# Patient Record
Sex: MALE | Race: WHITE | Employment: FULL TIME | URBAN - METROPOLITAN AREA
[De-identification: names, ages, dates, MRNs, and addresses within clinical notes are randomized per-mention and may not be internally consistent; named-entity substitution may affect disease eponyms.]

---

## 2017-08-21 ENCOUNTER — TRANSFERRED RECORDS (OUTPATIENT)
Dept: HEALTH INFORMATION MANAGEMENT | Facility: CLINIC | Age: 47
End: 2017-08-21

## 2017-09-27 ENCOUNTER — OFFICE VISIT (OUTPATIENT)
Dept: UROLOGY | Facility: CLINIC | Age: 47
End: 2017-09-27
Payer: COMMERCIAL

## 2017-09-27 VITALS
WEIGHT: 200 LBS | HEIGHT: 71 IN | BODY MASS INDEX: 28 KG/M2 | HEART RATE: 65 BPM | SYSTOLIC BLOOD PRESSURE: 114 MMHG | DIASTOLIC BLOOD PRESSURE: 68 MMHG | OXYGEN SATURATION: 98 %

## 2017-09-27 DIAGNOSIS — R97.20 ELEVATED PROSTATE SPECIFIC ANTIGEN (PSA): Primary | ICD-10-CM

## 2017-09-27 PROCEDURE — 99214 OFFICE O/P EST MOD 30 MIN: CPT | Performed by: UROLOGY

## 2017-09-27 ASSESSMENT — PAIN SCALES - GENERAL: PAINLEVEL: NO PAIN (0)

## 2017-09-27 NOTE — PROGRESS NOTES
History: It is a great pleasure to see this very pleasant 46-year-old gentleman in follow-up consultation today.  We recall, he has a history of an elevated PSA, when first seen a few years ago his PSA was 4.6 and then has tended to stabilize in the range between 2 and 3.  However there is also a significant family history of prostate cancer in that his father has had prostate cancer, has had a radical prostatectomy and is doing well.  However the most recent PSA is 5.36, although he has had no significant urinary symptoms and no evidence of history of chronic prostatitis.  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     Social History Main Topics     Smoking status: Never Smoker     Smokeless tobacco: Never Used     Alcohol use No     Drug use: No     Sexual activity: Yes     Partners: Female     Other Topics Concern     None     Social History Narrative       Past Surgical History:   Procedure Laterality Date     COLONOSCOPY  2007    benign colon polyps     HERNIORRHAPHY UMBILICAL N/A 7/15/2016    Procedure: HERNIORRHAPHY UMBILICAL;  Surgeon: Akil Granda MD;  Location: [facility]     LAPAROSCOPIC HERNIORRHAPHY INGUINAL BILATERAL Bilateral 7/15/2016    Procedure: LAPAROSCOPIC HERNIORRHAPHY INGUINAL BILATERAL;  Surgeon: Akil Granda MD;  Location: [facility]       Family History   Problem Relation Age of Onset     Cancer - colorectal Father      Prostate Cancer Father        No current outpatient prescriptions on file.    10 point ROS of systems including Constitutional, Eyes, Respiratory, Cardiovascular, Gastroenterology, Genitourinary, Integumentary, Muscularskeletal, Psychiatric  "were all negative except for pertinent positives noted in my HPI.    Examination:   /68 (BP Location: Left arm, Patient Position: Sitting, Cuff Size: Adult Regular)  Pulse 65  Ht 1.803 m (5' 11\")  Wt 90.7 kg (200 lb)  SpO2 98%  BMI 27.89 kg/m2  General Impression: Very pleasant gentleman in no acute distress, well-oriented in time place and person  Mental Status: Normal.  HEENT.  There is no evidence of jaundice and the mucous membranes are normal  Skin: Skin is otherwise normal to examination  Respiratory System: The respiratory cycle is normal  Lymph Nodes: Not examined  Back/Flank Tenderness: Not examined  Cardiovascular System: Not examined  Abdominal Examination: Not examined  Extremities: No significant peripheral edema  Genitial: Not examined  Rectal Examination: Good sphincter tone, normal perianal sensation.  Smooth rectal mucosa with small hemorrhoids identified in the 8:00 position.  Perineum otherwise normal to examination.  Smooth mildly enlarged prostate with slight increase in texture on the right side as compared to the left.  There is no evidence of tenderness or bogginess.  Seminal vesicles.  Nonpalpable  Neurologic System: There are no focal abnormal clinical neurological signs in the central, or peripheral nervous systems    Impression: I reviewed his previous records carefully and discussed the situation with the patient.  He is 46 years of age with a family history of prostate cancer with a PSA that has risen now above its previous maximum level and with a slight change in texture on the right side of the prostate that may well be nothing significant.  However, in my opinion we should arrange for a T3 MRI of prostate at this time for further information about the integrity of the prostate gland.  I did discuss and explain this to him in detail today and also talked about the potential need for a biopsy should we identify anything in the prostate that would be considered " "suspicious.  We did of course have a careful discussion about the specificity of PSA in about discussion also about prostate cancer and some of the controversy that is associated with management of prostate cancer  This may involve a biopsy here in our office or if necessary an MRI fusion biopsy by one of my [facility] should this be necessary.  I did discuss the entire situation very carefully today with the patient.  Answered many questions    Plan: T3 MRI of prostate and see me after    Time: 25 minutes total time today, extensive discussion regarding the ongoing situation needed including discussions about MRI of prostate and potential biopsies of prostate    \"This dictation was performed with voice recognition software and may contain errors,  omissions and inadvertent word substitution.\"      "

## 2017-09-27 NOTE — LETTER
9/27/2017       RE: Charlie Zhang  [id number] [facility] [id number]     Dear Colleague,    Thank you for referring your patient, Charlie Zhang, to the [facility] [place] [facility]. Please see a copy of my visit note below.    History: It is a great pleasure to see this very pleasant 46-year-old gentleman in follow-up consultation today.  We recall, he has a history of an elevated PSA, when first seen a few years ago his PSA was 4.6 and then has tended to stabilize in the range between 2 and 3.  However there is also a significant family history of prostate cancer in that his father has had prostate cancer, has had a radical prostatectomy and is doing well.  However the most recent PSA is 5.36, although he has had no significant urinary symptoms and no evidence of history of chronic prostatitis.  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     Social History Main Topics     Smoking status: Never Smoker     Smokeless tobacco: Never Used     Alcohol use No     Drug use: No     Sexual activity: Yes     Partners: Female     Other Topics Concern     None     Social History Narrative       Past Surgical History:   Procedure Laterality Date     COLONOSCOPY  2007    benign colon polyps     HERNIORRHAPHY UMBILICAL N/A 7/15/2016    Procedure: HERNIORRHAPHY UMBILICAL;  Surgeon: Akil Granda MD;  Location: [facility]     LAPAROSCOPIC HERNIORRHAPHY INGUINAL BILATERAL Bilateral 7/15/2016    Procedure: LAPAROSCOPIC HERNIORRHAPHY INGUINAL BILATERAL;  Surgeon: Akil Granda MD;  Location: [facility]       Family History  "  Problem Relation Age of Onset     Cancer - colorectal Father      Prostate Cancer Father        No current outpatient prescriptions on file.    10 point ROS of systems including Constitutional, Eyes, Respiratory, Cardiovascular, Gastroenterology, Genitourinary, Integumentary, Muscularskeletal, Psychiatric were all negative except for pertinent positives noted in my HPI.    Examination:   /68 (BP Location: Left arm, Patient Position: Sitting, Cuff Size: Adult Regular)  Pulse 65  Ht 1.803 m (5' 11\")  Wt 90.7 kg (200 lb)  SpO2 98%  BMI 27.89 kg/m2  General Impression: Very pleasant gentleman in no acute distress, well-oriented in time place and person  Mental Status: Normal.  HEENT.  There is no evidence of jaundice and the mucous membranes are normal  Skin: Skin is otherwise normal to examination  Respiratory System: The respiratory cycle is normal  Lymph Nodes: Not examined  Back/Flank Tenderness: Not examined  Cardiovascular System: Not examined  Abdominal Examination: Not examined  Extremities: No significant peripheral edema  Genitial: Not examined  Rectal Examination: Good sphincter tone, normal perianal sensation.  Smooth rectal mucosa with small hemorrhoids identified in the 8:00 position.  Perineum otherwise normal to examination.  Smooth mildly enlarged prostate with slight increase in texture on the right side as compared to the left.  There is no evidence of tenderness or bogginess.  Seminal vesicles.  Nonpalpable  Neurologic System: There are no focal abnormal clinical neurological signs in the central, or peripheral nervous systems    Impression: I reviewed his previous records carefully and discussed the situation with the patient.  He is 46 years of age with a family history of prostate cancer with a PSA that has risen now above its previous maximum level and with a slight change in texture on the right side of the prostate that may well be nothing significant.  However, in my opinion we " "should arrange for a T3 MRI of prostate at this time for further information about the integrity of the prostate gland.  I did discuss and explain this to him in detail today and also talked about the potential need for a biopsy should we identify anything in the prostate that would be considered suspicious.  We did of course have a careful discussion about the specificity of PSA in about discussion also about prostate cancer and some of the controversy that is associated with management of prostate cancer  This may involve a biopsy here in our office or if necessary an MRI fusion biopsy by one of my [facility] should this be necessary.  I did discuss the entire situation very carefully today with the patient.  Answered many questions    Plan: T3 MRI of prostate and see me after    Time: 25 minutes total time today, extensive discussion regarding the ongoing situation needed including discussions about MRI of prostate and potential biopsies of prostate    \"This dictation was performed with voice recognition software and may contain errors,  omissions and inadvertent word substitution.\"    Sincerely,    Chadd Marshall MD      "

## 2017-09-27 NOTE — MR AVS SNAPSHOT
After Visit Summary   9/27/2017    Charlie Zhang    MRN: [id number]           Patient Information     Date Of Birth          1970        Visit Information        Provider Department      9/27/2017 8:00 AM Chadd Marshall MD [facility] [place]        Today's Diagnoses     Elevated prostate specific antigen (PSA)    -  1       Follow-ups after your visit        Follow-up notes from your care team     Return for T3 MRI of prostate at , SEE ME AFTER.      Your next 10 appointments already scheduled     Oct 07, 2017  8:30 AM CDT   (Arrive by 8:15 AM)   MR PROSTATE with [id number]   [facility] ([facility])    [address] [address] [phone]           Take your medicines as usual, unless your doctor tells you not to. Bring a list of your current medicines to your exam (including vitamins, minerals and over-the-counter drugs).  You will be given intravenous contrast for this exam. To prepare:   The day before your exam, drink extra fluids at least six 8-ounce glasses (unless your doctor tells you to restrict your fluids).   Have a blood test (creatinine test) within 30 days of your exam. Go to your clinic or Diagnostic Imaging Department for this test.  The MRI machine uses a strong magnet. Please wear clothes without metal (snaps, zippers). A sweatsuit works well, or we may give you a hospital gown.  Please remove any body piercings and hair extensions before you arrive. You will also remove watches, jewelry, hairpins, wallets, dentures, partial dental plates and hearing aids. You may wear contact lenses, and you may be able to wear your rings. We have a safe place to keep your personal items, but it is safer to leave them at home.   **IMPORTANT** THE INSTRUCTIONS BELOW ARE ONLY FOR THOSE PATIENTS WHO HAVE BEEN TOLD THEY WILL RECEIVE SEDATION OR GENERAL ANESTHESIA DURING THEIR  MRI PROCEDURE:  IF YOU WILL RECEIVE SEDATION (take medicine to help you relax during your exam):   You must get the medicine from your doctor before you arrive. Bring the medicine to the exam. Do not take it at home.   Arrive one hour early. Bring someone who can take you home after the test. Your medicine will make you sleepy. After the exam, you may not drive, take a bus or take a taxi by yourself.   No eating 8 hours before your exam. You may have clear liquids up until 4 hours before your exam. (Clear liquids include water, clear tea, black coffee and fruit juice without pulp.)  IF YOU WILL RECEIVE ANESTHESIA (be asleep for your exam):   Arrive 1 1/2 hours early. Bring someone who can take you home after the test. You may not drive, take a bus or take a taxi by yourself.   No eating 8 hours before your exam. You may have clear liquids up until 4 hours before your exam. (Clear liquids include water, clear tea, black coffee and fruit juice without pulp.)  Please call the Imaging Department at your exam site with any questions.            Oct 16, 2017  8:20 AM CDT   Return Visit with Chadd Marshall MD   [facility] ([facility])    [address]  [address]  [facility] [id number] [phone]              Future tests that were ordered for you today     Open Future Orders        Priority Expected Expires Ordered    MR Prostate [[id number]] Routine  9/27/2018 9/27/2017            Who to contact     If you have questions or need follow up information about today's clinic visit or your schedule please contact [facility] directly at [phone].  Normal or non-critical lab and imaging results will be communicated to you by MyChart, letter or phone within 4 business days after the clinic has received the results. If you do not hear from us within 7 days, please contact the clinic through MyChart or phone. If you have a  "critical or abnormal lab result, we will notify you by phone as soon as possible.  Submit refill requests through [employer] or call your pharmacy and they will forward the refill request to us. Please allow 3 business days for your refill to be completed.          Additional Information About Your Visit        [facility] Information     [employer] lets you send messages to your doctor, view your test results, renew your prescriptions, schedule appointments and more. To sign up, go to [email or web address] . Click on \"Log in\" on the left side of the screen, which will take you to the Welcome page. Then click on \"Sign up Now\" on the right side of the page.     You will be asked to enter the access code listed below, as well as some personal information. Please follow the directions to create your username and password.     Your access code is: [email or web address]  Expires: 2017  8:34 AM     Your access code will  in 90 days. If you need help or a new code, please call your [facility] clinic or [phone].        Care EveryWhere ID     This is your Care EveryWhere ID. This could be used by other organizations to access your [facility] medical records  [id number]        Your Vitals Were     Pulse Height Pulse Oximetry BMI (Body Mass Index)          65 1.803 m (5' 11\") 98% 27.89 kg/m2         Blood Pressure from Last 3 Encounters:   17 114/68   10/26/16 120/80   07/15/16 106/67    Weight from Last 3 Encounters:   17 90.7 kg (200 lb)   10/26/16 93 kg (205 lb)   07/15/16 93.4 kg (205 lb 14.4 oz)               Primary Care Provider Office Phone # Fax #    Russell Nur [phone]       [facility] [address] [facility] [address]  [facility] [address]        Equal Access to Services     [facility]: Candice Nevarez, wawilfredoda luqadaha, qaybta kaalvipul barrera . [facility] [phone].    ATENCIÓN: Si tobias foster, tiene a pritchett disposición " servicios gratuitos de asistencia lingüística. Michael soto [phone].    We comply with applicable federal civil rights laws and [place] laws. We do not discriminate on the basis of race, color, national origin, age, disability sex, sexual orientation or gender identity.            Thank you!     Thank you for choosing [facility]  for your care. Our goal is always to provide you with excellent care. Hearing back from our patients is one way we can continue to improve our services. Please take a few minutes to complete the written survey that you may receive in the mail after your visit with us. Thank you!             Your Updated Medication List - Protect others around you: Learn how to safely use, store and throw away your medicines at www.disposemymeds.org.      Notice  As of 9/27/2017  8:37 AM    You have not been prescribed any medications.

## 2017-10-16 ENCOUNTER — OFFICE VISIT (OUTPATIENT)
Dept: UROLOGY | Facility: CLINIC | Age: 47
End: 2017-10-16
Payer: COMMERCIAL

## 2017-10-16 VITALS
BODY MASS INDEX: 28 KG/M2 | DIASTOLIC BLOOD PRESSURE: 76 MMHG | SYSTOLIC BLOOD PRESSURE: 122 MMHG | HEIGHT: 71 IN | WEIGHT: 200 LBS | HEART RATE: 64 BPM

## 2017-10-16 DIAGNOSIS — R97.20 ELEVATED PROSTATE SPECIFIC ANTIGEN (PSA): Primary | ICD-10-CM

## 2017-10-16 PROCEDURE — 99214 OFFICE O/P EST MOD 30 MIN: CPT | Performed by: UROLOGY

## 2017-10-16 RX ORDER — CIPROFLOXACIN 500 MG/1
500 TABLET, FILM COATED ORAL 2 TIMES DAILY
Qty: 3 TABLET | Refills: 0 | Status: SHIPPED | OUTPATIENT
Start: 2017-10-16 | End: 2017-11-08

## 2017-10-16 ASSESSMENT — PAIN SCALES - GENERAL: PAINLEVEL: NO PAIN (0)

## 2017-10-16 NOTE — PATIENT INSTRUCTIONS
Ultrasound Guided Prostate Biopsy      What is a prostate biopsy?  A prostate biopsy is a relatively painless procedure performed in the physician's office, outpatient facility or hospital.  A long, thin needle is inserted into the prostate to collect a sample of tissue from the prostate.  These samples are then examined by a pathologist for abnormal cells.    Why do I need a prostate biopsy? During a man's lifetime the prostate gradually increases in size.  The patient may or may not experience symptoms.  Symptoms of an enlarge prostate include:    Increased frequency of urination    Decreased force of urinary stream    Trouble with urination    Awakening at night to urinate    When the prostate is examined, the physician may feel a nodule (hard or firm growth) which would require a biopsy.    Another reason for having a prostate biopsy is an increase in the prostate specific androgen (PSA) in your blood.  A prostate biopsy may be necessary to determine the cause of the increased PSA.    Your physician will also perform an ultrasound (an image created by sound waves).  The ultrasound is performed by placing a small probe in the rectum to image the prosate gland.    Preparation for the Prostate Biopsy    1.  During the week before your prostate biopsy substances that may thin your blood (ASPIRIN, BABY ASPIRIN, ACETYLSALICYLIC ACID, ADVIL/MOTRIN, IBUPROFEN, ALEVE, COUMADIN (WARFARIN), PRADAXA, ELIQUIS, PLAVIX, XARELTO, BRILINTA, VITAMIN E, FISH OIL) must be discontinued.  If you are in doubt, please call.  This is a very important requirement and your procedure may be cancelled if you have not stopped taking all of these medications.    2.  If you have any bleeding problems (thin blood), please tell your doctor.    3.  If you have been told by another doctor to take antibiotics before dental work or surgery, please tell the doctor.  This is common for patients that have had a joint replacement.    YOU HAVE BEEN  PRESCRIBED AN ANTIBIOTIC.  Please follow the directions written on the bottle.  The directions usually will tell you to start the antibiotic the day before your biopsy.  You will continue taking the medication until it is gone.    The day of the biopsy you will be asked to use an enema one hour before you leave for your appointment.    Unless you have been prescribed a sedative (Valium or a similar drug) you will be able to drive to and from your appointment.  If you have taken a sedative you must have a ride.    AFTER THE BIOPSY    DANGER SIGNS    Small amount of blood in the urine for 10-14 days Excessive blood in the urine, stool or ejaculate  Small amount of blood in the stool for 48 hours Fever over 100 or chills  Small amount of blood in the ejaculate for up to Frequent urination or burning when you urinate   4 weeks          Your biopsy is scheduled [id number]___[id number]_________________________ at our [place] office.  Please be at     The office at ____[id number]________________.  A follow up visit has been scheduled for you on     _____[id number] at 820am__________________________ at the  ________[place] office________________________.    Your doctor will discuss your results with you at this visit.    CALL THE DOCTOR'S OFFICE -[phone] IF YOU HAVE ANY OF THESE SYMPTOMS.  IF YOU CANNOT CONTACT THE OFFICE, GO TO THE EMERGENCY ROOM.    _

## 2017-10-16 NOTE — LETTER
10/16/2017       RE: Charlie Zhang  [address] [facility] [address]     Dear Colleague,    Thank you for referring your patient, Charlie Zhang, to the [facility] [place] at [facility]. Please see a copy of my visit note below.    It is a very pleasant to see this very pleasant 47-year-old gentleman in follow-up consultation today.  As we recall, he has a significant family history of prostate cancer, with a rising PSA the most recent of which had risen to 5.36.  Digital rectal examination was unremarkable.  His father has had a radical prostatectomy for treatment of prostate cancer.  We therefore arrange for MRI of the prostate.    MRI PROSTATE:  10/7/2017 9:52 AM     CLINICAL HISTORY: Elevated prostate specific antigen (PSA)     Comparison: None.     TECHNIQUE:      The following sequences were obtained: High-resolution axial  T2-weighted, coronal T2-weighted, 3D volumetric T2-weighted, axial  pre-contrast T1, axial diffusion-weighted, axial apparent diffusion  coefficient and axial dynamic contrast-enhanced T1. Postcontrast  images were evaluated on a separate workstation to evaluate dynamic  contrast enhancement.    Contrast dose: 10mL Gadavist     The images are interpreted according to PI-RADS v.2     FINDINGS:  Prostate gland size: 4.6 x 4.0 x 3.5  Volume: 34 cc      Peripheral zone: In the anterior peripheral zone near the mid gland  apical region (series 7 image 63, series 5 image 23, series 11 image  23, series 9 image 22), there is a 5 mm focus of T2 hypointensity with  markedly focal restricted diffusion (ADC value of approximately 700).  This does not show definite focal early enhancement. No evidence for  extracapsular extension.     PI-RADS:  T2-weighted: 3  Diffusion-weighted image: 4  Contrast-enhanced images: Negative     Overall assessment: 4     Transitional zone: There are BPH type changes without  suspicious  lesion.     Remainder of the pelvis:  Bladder is decompressed, appears  unremarkable. No significant lymphadenopathy; there is a 7 mm right  external iliac node. No free fluid. No suspicious focal bony lesions.  Cystic, nonenhancing tubular structure along the midline near the  prostatic apex extending into the perineum. This appearance is  somewhat nonspecific, but possibly represents a mullerian duct cyst.  This has benign appearance.         IMPRESSION:   1. Based on the most suspicious abnormality, this exam is  characterized as PI-RADS 4-high probability. Clinically significant  cancer is likely to be present. There is a small 5 mm focus of signal  abnormality in the anterior peripheral zone/anterior fibromuscular  stroma towards the apex. Elsewhere in the peripheral zone, there is  mild, indistinct T2 hypointensity in the posterior peripheral zone  around the apex, most suggestive of prostatitis related change, though  diffuse low-grade neoplasm could have similar appearance.  2. No evidence of extraprostatic malignancy. No suspicious adenopathy  or evidence of pelvic metastases.   3. Benign cystic structure located posterior to the apex of the  prostate.     The images are interpreted according to PI-RADS v2.  http://www.acr.org/~/media/ACR/Documents/PDF/QualitySafety/Resources  PIRADS/[email or web address]     I have personally reviewed the examination and initial interpretation  and I agree with the findings.     BLANCA SHEN    Impression.  I discussed the results with the patient in detail.  We personally went over the images.  We note that there is a suspicious area anterior to the apex of the prostate.  This area is quite small.  His prostate volume is 34 cc.  We will need to proceed with a biopsy of this area and other areas of the prostate in the near future.  I explained the procedure of ultrasound and biopsy of the prostate to the patient in detail.  I went over the potential side effects  "and complications including hemorrhage, hematospermia, and infection.  We did have a discussion about prostate cancer and some of the control versus associated with the current management of this condition.  I will arrange for the biopsies in the future.    Plan.  Transrectal ultrasound and biopsies of prostate.    Time.  25 minutes.  Greater than 50% was spent in discussion, consultation, review of MRI scans in detail, and discussion of possible outcomes as a result of the biopsies proposed.    \"This dictation was performed with voice recognition software and may contain errors,  omissions and inadvertent word substitution.\"             Order-Level Documents:        Again, thank you for allowing me to participate in the care of your patient.      Sincerely,    Chadd Marshall MD      "

## 2017-10-16 NOTE — PROGRESS NOTES
It is a very pleasant to see this very pleasant 47-year-old gentleman in follow-up consultation today.  As we recall, he has a significant family history of prostate cancer, with a rising PSA the most recent of which had risen to 5.36.  Digital rectal examination was unremarkable.  His father has had a radical prostatectomy for treatment of prostate cancer.  We therefore arrange for MRI of the prostate.    MRI PROSTATE:  10/7/2017 9:52 AM     CLINICAL HISTORY: Elevated prostate specific antigen (PSA)     Comparison: None.     TECHNIQUE:      The following sequences were obtained: High-resolution axial  T2-weighted, coronal T2-weighted, 3D volumetric T2-weighted, axial  pre-contrast T1, axial diffusion-weighted, axial apparent diffusion  coefficient and axial dynamic contrast-enhanced T1. Postcontrast  images were evaluated on a separate workstation to evaluate dynamic  contrast enhancement.    Contrast dose: 10mL Gadavist     The images are interpreted according to PI-RADS v.2     FINDINGS:  Prostate gland size: 4.6 x 4.0 x 3.5  Volume: 34 cc      Peripheral zone: In the anterior peripheral zone near the mid gland  apical region (series 7 image 63, series 5 image 23, series 11 image  23, series 9 image 22), there is a 5 mm focus of T2 hypointensity with  markedly focal restricted diffusion (ADC value of approximately 700).  This does not show definite focal early enhancement. No evidence for  extracapsular extension.     PI-RADS:  T2-weighted: 3  Diffusion-weighted image: 4  Contrast-enhanced images: Negative     Overall assessment: 4     Transitional zone: There are BPH type changes without suspicious  lesion.     Remainder of the pelvis:  Bladder is decompressed, appears  unremarkable. No significant lymphadenopathy; there is a 7 mm right  external iliac node. No free fluid. No suspicious focal bony lesions.  Cystic, nonenhancing tubular structure along the midline near the  prostatic apex extending into the  perineum. This appearance is  somewhat nonspecific, but possibly represents a mullerian duct cyst.  This has benign appearance.         IMPRESSION:   1. Based on the most suspicious abnormality, this exam is  characterized as PI-RADS 4-high probability. Clinically significant  cancer is likely to be present. There is a small 5 mm focus of signal  abnormality in the anterior peripheral zone/anterior fibromuscular  stroma towards the apex. Elsewhere in the peripheral zone, there is  mild, indistinct T2 hypointensity in the posterior peripheral zone  around the apex, most suggestive of prostatitis related change, though  diffuse low-grade neoplasm could have similar appearance.  2. No evidence of extraprostatic malignancy. No suspicious adenopathy  or evidence of pelvic metastases.   3. Benign cystic structure located posterior to the apex of the  prostate.     The images are interpreted according to PI-RADS v2.  http://www.acr.org/~/media/ACR/Documents/PDF/QualitySafety/Resources  PIRADS/[email or web address].pdf     I have personally reviewed the examination and initial interpretation  and I agree with the findings.     BLANCA SHEN    Impression.  I discussed the results with the patient in detail.  We personally went over the images.  We note that there is a suspicious area anterior to the apex of the prostate.  This area is quite small.  His prostate volume is 34 cc.  We will need to proceed with a biopsy of this area and other areas of the prostate in the near future.  I explained the procedure of ultrasound and biopsy of the prostate to the patient in detail.  I went over the potential side effects and complications including hemorrhage, hematospermia, and infection.  We did have a discussion about prostate cancer and some of the control versus associated with the current management of this condition.  I will arrange for the biopsies in the future.    Plan.  Transrectal ultrasound and biopsies of prostate.    Time.  25  "minutes.  Greater than 50% was spent in discussion, consultation, review of MRI scans in detail, and discussion of possible outcomes as a result of the biopsies proposed.    \"This dictation was performed with voice recognition software and may contain errors,  omissions and inadvertent word substitution.\"             Order-Level Documents:        "

## 2017-10-16 NOTE — MR AVS SNAPSHOT
After Visit Summary   10/16/2017    Charlie Zhang    MRN: [id number]           Patient Information     Date Of Birth          1970        Visit Information        Provider Department      10/16/2017 8:20 AM Chadd Marshall MD [facility] [place]        Today's Diagnoses     Elevated prostate specific antigen (PSA)    -  1      Care Instructions    Ultrasound Guided Prostate Biopsy      What is a prostate biopsy?  A prostate biopsy is a relatively painless procedure performed in the physician's office, outpatient facility or hospital.  A long, thin needle is inserted into the prostate to collect a sample of tissue from the prostate.  These samples are then examined by a pathologist for abnormal cells.    Why do I need a prostate biopsy? During a man's lifetime the prostate gradually increases in size.  The patient may or may not experience symptoms.  Symptoms of an enlarge prostate include:    Increased frequency of urination    Decreased force of urinary stream    Trouble with urination    Awakening at night to urinate    When the prostate is examined, the physician may feel a nodule (hard or firm growth) which would require a biopsy.    Another reason for having a prostate biopsy is an increase in the prostate specific androgen (PSA) in your blood.  A prostate biopsy may be necessary to determine the cause of the increased PSA.    Your physician will also perform an ultrasound (an image created by sound waves).  The ultrasound is performed by placing a small probe in the rectum to image the prosate gland.    Preparation for the Prostate Biopsy    1.  During the week before your prostate biopsy substances that may thin your blood (ASPIRIN, BABY ASPIRIN, ACETYLSALICYLIC ACID, ADVIL/MOTRIN, IBUPROFEN, ALEVE, COUMADIN (WARFARIN), PRADAXA, ELIQUIS, PLAVIX, XARELTO, BRILINTA, VITAMIN E, FISH OIL) must be discontinued.  If you are in doubt, please call.   This is a very important requirement and your procedure may be cancelled if you have not stopped taking all of these medications.    2.  If you have any bleeding problems (thin blood), please tell your doctor.    3.  If you have been told by another doctor to take antibiotics before dental work or surgery, please tell the doctor.  This is common for patients that have had a joint replacement.    YOU HAVE BEEN PRESCRIBED AN ANTIBIOTIC.  Please follow the directions written on the bottle.  The directions usually will tell you to start the antibiotic the day before your biopsy.  You will continue taking the medication until it is gone.    The day of the biopsy you will be asked to use an enema one hour before you leave for your appointment.    Unless you have been prescribed a sedative (Valium or a similar drug) you will be able to drive to and from your appointment.  If you have taken a sedative you must have a ride.    AFTER THE BIOPSY    DANGER SIGNS    Small amount of blood in the urine for 10-14 days Excessive blood in the urine, stool or ejaculate  Small amount of blood in the stool for 48 hours Fever over 100 or chills  Small amount of blood in the ejaculate for up to Frequent urination or burning when you urinate   4 weeks          Your biopsy is scheduled jb___35-08-12_________________________ at our [place] office.  Please be at     The office at ____905am________________.  A follow up visit has been scheduled for you on     _____[id number] at 820am__________________________ at the  ________[place] office________________________.    Your doctor will discuss your results with you at this visit.    CALL THE DOCTOR'S OFFICE -[phone] IF YOU HAVE ANY OF THESE SYMPTOMS.  IF YOU CANNOT CONTACT THE OFFICE, GO TO THE EMERGENCY ROOM.    _             Follow-ups after your visit        Follow-up notes from your care team     Return for TRUS with prostate biopsy, SEE ME AFTER.      Your next 10 appointments already  "scheduled     Oct 31, 2017 10:00 AM CDT   Sonography/Biopsy with Chadd Marshall MD, UA BX ROOM   [facility] ([facility])    [address]  [address]  [facility] [address] [phone]            2017  8:20 AM CST   Return Visit with Chadd Marshall MD   [facility] ([facility])    [address]  [address]  [facility] [address] [phone]              Who to contact     If you have questions or need follow up information about today's clinic visit or your schedule please contact [facility] directly at [phone].  Normal or non-critical lab and imaging results will be communicated to you by AFFiRiShart, letter or phone within 4 business days after the clinic has received the results. If you do not hear from us within 7 days, please contact the clinic through AFFiRiShart or phone. If you have a critical or abnormal lab result, we will notify you by phone as soon as possible.  Submit refill requests through Adyen or call your pharmacy and they will forward the refill request to us. Please allow 3 business days for your refill to be completed.          Additional Information About Your Visit        AFFiRiShart Information     Adyen lets you send messages to your doctor, view your test results, renew your prescriptions, schedule appointments and more. To sign up, go to [email or web address] . Click on \"Log in\" on the left side of the screen, which will take you to the Welcome page. Then click on \"Sign up Now\" on the right side of the page.     You will be asked to enter the access code listed below, as well as some personal information. Please follow the directions to create your username and password.     Your access code is: [email or web address]  Expires: 2017  8:34 AM     Your access code will  in 90 days. If you need help or a new " "code, please call your [facility] clinic or [phone].        Care EveryWhere ID     This is your Care EveryWhere ID. This could be used by other organizations to access your [facility] medical records  [id number]        Your Vitals Were     Pulse Height BMI (Body Mass Index)             64 1.803 m (5' 11\") 27.89 kg/m2          Blood Pressure from Last 3 Encounters:   10/16/17 122/76   09/27/17 114/68   10/26/16 120/80    Weight from Last 3 Encounters:   10/16/17 90.7 kg (200 lb)   09/27/17 90.7 kg (200 lb)   10/26/16 93 kg (205 lb)              Today, you had the following     No orders found for display         Today's Medication Changes          These changes are accurate as of: 10/16/17  9:08 AM.  If you have any questions, ask your nurse or doctor.               Start taking these medicines.        Dose/Directions    ciprofloxacin 500 MG tablet   Commonly known as:  CIPRO   Used for:  Elevated prostate specific antigen (PSA)   Started by:  Chadd Marshall MD        Dose:  500 mg   Take 1 tablet (500 mg) by mouth 2 times daily   Quantity:  3 tablet   Refills:  0            Where to get your medicines      These medications were sent to [facility]/pharmacy #[id number] - [place], [place] - [address] [place] [address] [address] [address]     Phone:  [phone]     ciprofloxacin 500 MG tablet                Primary Care Provider Office Phone # Fax #    Russell Nur [phone]       [facility] [address]   [facility] [address]        Equal Access to Services     [facility] AH: Hadii aad ku hadasho Soomaali, waaxda luqadaha, qaybta kaalmada vipul lomas. So [facility] [phone].    ATENCIÓN: Si habla español, tiene a pritchett disposición servicios gratuitos de asistencia lingüística. Llame al [phone].    We comply with applicable federal civil rights laws and [place] laws. We do not discriminate on the " basis of race, color, national origin, age, disability, sex, sexual orientation, or gender identity.            Thank you!     Thank you for choosing [facility] [place]  for your care. Our goal is always to provide you with excellent care. Hearing back from our patients is one way we can continue to improve our services. Please take a few minutes to complete the written survey that you may receive in the mail after your visit with us. Thank you!             Your Updated Medication List - Protect others around you: Learn how to safely use, store and throw away your medicines at www.disposemymeds.org.          This list is accurate as of: 10/16/17  9:08 AM.  Always use your most recent med list.                   Brand Name Dispense Instructions for use Diagnosis    ciprofloxacin 500 MG tablet    CIPRO    3 tablet    Take 1 tablet (500 mg) by mouth 2 times daily    Elevated prostate specific antigen (PSA)

## 2017-10-31 ENCOUNTER — TRANSFERRED RECORDS (OUTPATIENT)
Dept: HEALTH INFORMATION MANAGEMENT | Facility: CLINIC | Age: 47
End: 2017-10-31

## 2017-10-31 ENCOUNTER — OFFICE VISIT (OUTPATIENT)
Dept: UROLOGY | Facility: CLINIC | Age: 47
End: 2017-10-31
Payer: COMMERCIAL

## 2017-10-31 VITALS
WEIGHT: 199 LBS | DIASTOLIC BLOOD PRESSURE: 70 MMHG | OXYGEN SATURATION: 98 % | BODY MASS INDEX: 27.86 KG/M2 | SYSTOLIC BLOOD PRESSURE: 110 MMHG | HEART RATE: 75 BPM | HEIGHT: 71 IN

## 2017-10-31 DIAGNOSIS — R97.20 ELEVATED PROSTATE SPECIFIC ANTIGEN (PSA): Primary | ICD-10-CM

## 2017-10-31 PROCEDURE — 00000159 ZZHCL STATISTIC H-SEND OUTS PREP: Performed by: UROLOGY

## 2017-10-31 PROCEDURE — 00000093 ZZHCL STATISTIC COURTESY CONSULT: Performed by: UROLOGY

## 2017-10-31 PROCEDURE — 88305 TISSUE EXAM BY PATHOLOGIST: CPT | Performed by: UROLOGY

## 2017-10-31 PROCEDURE — 55700 HC BIOPSY PROSTATE NEEDLE/PUNCH: CPT | Performed by: UROLOGY

## 2017-10-31 PROCEDURE — 76872 US TRANSRECTAL: CPT | Performed by: UROLOGY

## 2017-10-31 PROCEDURE — 88344 IMHCHEM/IMCYTCHM EA MLT ANTB: CPT | Performed by: UROLOGY

## 2017-10-31 ASSESSMENT — PAIN SCALES - GENERAL
PAINLEVEL: NO PAIN (0)
PAINLEVEL: NO PAIN (0)

## 2017-10-31 NOTE — NURSING NOTE
Pre-Operative    Consent read and signed: Yes     Allergies   Allergen Reactions     No Known Drug Allergies      Pre-operative antibiotics taken: Yes  Aspirin or other blood thinning medications not taken in 7-10 days:  Yes  Time of Fleet's enema: 7:45AM        Patient was here too talk over with DR Marshall   And his father who is a DR that also work at the [place]  .

## 2017-10-31 NOTE — PROGRESS NOTES
This very pleasant 47-year-old gentleman returns today for transrectal ultrasound and biopsy of the prostate.    I had a full discussion with the patient and his family prior to the biopsy to go over the procedure itself to talk about what we're looking for, and to have a discussion about potential treatment options if we do encounter prostate cancer in the results of the biopsies.  The patient has a significant family history of prostate cancer.  There was some slight increase in texture on the right side of the prostate on digital rectal examination along with his rising PSA.    Procedure.  Transrectal ultrasound biopsy of prostate.  Surgeon.  Joanna.  Anesthesia.  Periprosthetic block with lidocaine.  Description  With the patient in the left lateral position, and after digital rectal examination performed, a well-lubricated transrectal probe was inserted in the rectum.  Careful ultrasound examination was performed showing little evidence of hypoechogenicity in the prostate.  Prostate volume was 46 cc.  The periprosthetic block was administered.  A total of 12 biopsies were taken from the prostate itself in the usual sextant fashion and a further biopsy was taken from the right seminal vesicle, which was the side on which we found the change digitally in the texture of the prostate.  Pressure was applied to the prostate digitally following removal of the ultrasound probe.  The patient tolerated the procedure well    We will have further discussions with the patient on the phone when we have the results and have further discussions in the office to discuss out from move forward thereafter.  I went over the entire situation with the patient in detail today.  I answered all questions.    Plan.  We will see him to go over the results of the biopsy in about a week's time.    Time.  15 minutes was spent in addition to the procedure itself because we had a considerable family discussion prior to the procedure to go  "over the procedure itself and many issues related to prostate cancer in view of the strong family history of this disease, and also my discussion of the procedure with the patient.    \"This dictation was performed with voice recognition software and may contain errors,  omissions and inadvertent word substitution.\"    "

## 2017-10-31 NOTE — PATIENT INSTRUCTIONS
[facility]  Transrectal Ultrasound  Post Operative Information    The physician who performed your Transrectal Ultrasound is Dr Marshall (telephone number [phone]).  Please contact this doctor if you have any problems or questions.  If unable to reach your doctor, please return to the Emergency Department.      Take one antibiotic the evening of the procedure and then as directed on your prescription.    Drink at least 6-8 glasses of fluids for the first 48 hours.    Avoid heavy lifting and strenuous activity for 48 hours.    Avoid sexual intercourse for the first 24 hours.    No aspirin or ibuprofen products (Motrin, Advil, Nuprin, ect.) for one week.  You may take acetaminophen (Tylenol) for pain.    You may notice a small amount of blood on the tissue after a bowel movement.    You may pass blood with clots in your urine following the procedure.  The amount will decrease with time but may be visible for up to two weeks.     You make have blood in your semen for 4 weeks after the procedure.    You may experience mild perineal (groin area) discomfort after the procedure.    Please call you doctor if you have any of the follow symptoms:  Fever  Increase in the amount of blood passed  Severe discomfort or pain    Ask to speak to Sumit with any question.      Ne Galvan MA

## 2017-10-31 NOTE — MR AVS SNAPSHOT
After Visit Summary   10/31/2017    Charlie Zhang    MRN: [id number]           Patient Information     Date Of Birth          1970        Visit Information        Provider Department      10/31/2017 10:00 AM Chadd Marshall MD;  BX ROOM [facility] [place]        Today's Diagnoses     Elevated prostate specific antigen (PSA)    -  1      Care Instructions    [facility]  Transrectal Ultrasound  Post Operative Information    The physician who performed your Transrectal Ultrasound is Dr Marshall (telephone number [phone]).  Please contact this doctor if you have any problems or questions.  If unable to reach your doctor, please return to the Emergency Department.      Take one antibiotic the evening of the procedure and then as directed on your prescription.    Drink at least 6-8 glasses of fluids for the first 48 hours.    Avoid heavy lifting and strenuous activity for 48 hours.    Avoid sexual intercourse for the first 24 hours.    No aspirin or ibuprofen products (Motrin, Advil, Nuprin, ect.) for one week.  You may take acetaminophen (Tylenol) for pain.    You may notice a small amount of blood on the tissue after a bowel movement.    You may pass blood with clots in your urine following the procedure.  The amount will decrease with time but may be visible for up to two weeks.     You make have blood in your semen for 4 weeks after the procedure.    You may experience mild perineal (groin area) discomfort after the procedure.    Please call you doctor if you have any of the follow symptoms:  Fever  Increase in the amount of blood passed  Severe discomfort or pain    Ask to speak to Sumit with any question.      Ne Galvan MA              Follow-ups after your visit        Your next 10 appointments already scheduled     Nov 08, 2017  8:20 AM CST   Return Visit with Chadd Marshall MD   [facility]  [facility] [place] ([facility])    [address]  [address]  [facility] [address]   [phone]              Future tests that were ordered for you today     Open Future Orders        Priority Expected Expires Ordered    US Endorectal (47131) Routine  10/31/2018 10/31/2017    US GUIDE FOR NEEDLE PLACEMENT (04098) Routine 2018 2018 10/31/2017            Who to contact     If you have questions or need follow up information about today's clinic visit or your schedule please contact [facility] [place] directly at [phone].  Normal or non-critical lab and imaging results will be communicated to you by Projjixhart, letter or phone within 4 business days after the clinic has received the results. If you do not hear from us within 7 days, please contact the clinic through Projjixhart or phone. If you have a critical or abnormal lab result, we will notify you by phone as soon as possible.  Submit refill requests through Noteleaf or call your pharmacy and they will forward the refill request to us. Please allow 3 business days for your refill to be completed.          Additional Information About Your Visit        ProjjixharLooxii Information     Noteleaf lets you send messages to your doctor, view your test results, renew your prescriptions, schedule appointments and more. To sign up, go to [email or web address] . Click on \"Log in\" on the left side of the screen, which will take you to the Welcome page. Then click on \"Sign up Now\" on the right side of the page.     You will be asked to enter the access code listed below, as well as some personal information. Please follow the directions to create your username and password.     Your access code is: [email or web address]  Expires: 2017  8:34 AM     Your access code will  in 90 days. If you need help or a new code, please call your [place] clinic or [phone].        Care EveryWhere ID     This is your Care EveryWhere ID. This " "could be used by other organizations to access your [place] medical records  [id number]        Your Vitals Were     Pulse Height Pulse Oximetry BMI (Body Mass Index)          75 1.803 m (5' 11\") 98% 27.75 kg/m2         Blood Pressure from Last 3 Encounters:   10/31/17 110/70   10/16/17 122/76   09/27/17 114/68    Weight from Last 3 Encounters:   10/31/17 90.3 kg (199 lb)   10/16/17 90.7 kg (200 lb)   09/27/17 90.7 kg (200 lb)              We Performed the Following     BIOPSY PROSTATE NEEDLE/PUNCH (53644)        Primary Care Provider Office Phone # Fax #    Russell Nur [phone]       [facility] [address] [facility] [address]  [facility] [address]        Equal Access to Services     [facility] : Hadii aad ku hadasho Soomaali, waaxda luqadaha, qaybta kaalmada adeegyada, vipul grossman hayaan aravind lucio . So [facility] [phone].    ATENCIÓN: Si habla español, tiene a pritchett disposición servicios gratuitos de asistencia lingüística. Michael al [phone].    We comply with applicable federal civil rights laws and [place] laws. We do not discriminate on the basis of race, color, national origin, age, disability, sex, sexual orientation, or gender identity.            Thank you!     Thank you for choosing [facility] [place]  for your care. Our goal is always to provide you with excellent care. Hearing back from our patients is one way we can continue to improve our services. Please take a few minutes to complete the written survey that you may receive in the mail after your visit with us. Thank you!             Your Updated Medication List - Protect others around you: Learn how to safely use, store and throw away your medicines at www.disposemymeds.org.          This list is accurate as of: 10/31/17 11:11 AM.  Always use your most recent med list.                   Brand Name Dispense Instructions for use Diagnosis    ciprofloxacin 500 MG tablet    CIPRO "    3 tablet    Take 1 tablet (500 mg) by mouth 2 times daily    Elevated prostate specific antigen (PSA)

## 2017-10-31 NOTE — LETTER
10/31/2017       RE: Charlie Zhang  [address] [facility] [address]     Dear Colleague,    Thank you for referring your patient, Charlie Zhang, to the [facility] [place] at [facility]. Please see a copy of my visit note below.    This very pleasant 47-year-old gentleman returns today for transrectal ultrasound and biopsy of the prostate.    I had a full discussion with the patient and his family prior to the biopsy to go over the procedure itself to talk about what we're looking for, and to have a discussion about potential treatment options if we do encounter prostate cancer in the results of the biopsies.  The patient has a significant family history of prostate cancer.  There was some slight increase in texture on the right side of the prostate on digital rectal examination along with his rising PSA.    Procedure.  Transrectal ultrasound biopsy of prostate.  Surgeon.  Joanna.  Anesthesia.  Periprosthetic block with lidocaine.  Description  With the patient in the left lateral position, and after digital rectal examination performed, a well-lubricated transrectal probe was inserted in the rectum.  Careful ultrasound examination was performed showing little evidence of hypoechogenicity in the prostate.  Prostate volume was 46 cc.  The periprosthetic block was administered.  A total of 12 biopsies were taken from the prostate itself in the usual sextant fashion and a further biopsy was taken from the right seminal vesicle, which was the side on which we found the change digitally in the texture of the prostate.  Pressure was applied to the prostate digitally following removal of the ultrasound probe.  The patient tolerated the procedure well    We will have further discussions with the patient on the phone when we have the results and have further discussions in the office to discuss out from move forward thereafter.  I went over  "the entire situation with the patient in detail today.  I answered all questions.    Plan.  We will see him to go over the results of the biopsy in about a week's time.    Time.  15 minutes was spent in addition to the procedure itself because we had a considerable family discussion prior to the procedure to go over the procedure itself and many issues related to prostate cancer in view of the strong family history of this disease, and also my discussion of the procedure with the patient.    \"This dictation was performed with voice recognition software and may contain errors,  omissions and inadvertent word substitution.\"      Again, thank you for allowing me to participate in the care of your patient.      Sincerely,    Chadd Marshall MD      "

## 2017-11-08 ENCOUNTER — OFFICE VISIT (OUTPATIENT)
Dept: UROLOGY | Facility: CLINIC | Age: 47
End: 2017-11-08
Payer: COMMERCIAL

## 2017-11-08 VITALS
BODY MASS INDEX: 28 KG/M2 | SYSTOLIC BLOOD PRESSURE: 120 MMHG | HEIGHT: 71 IN | WEIGHT: 200 LBS | DIASTOLIC BLOOD PRESSURE: 100 MMHG | HEART RATE: 70 BPM

## 2017-11-08 DIAGNOSIS — R97.20 ELEVATED PROSTATE SPECIFIC ANTIGEN (PSA): Primary | ICD-10-CM

## 2017-11-08 DIAGNOSIS — Z80.42 FAMILY HISTORY OF PROSTATE CANCER IN FATHER: ICD-10-CM

## 2017-11-08 PROCEDURE — 76872 US TRANSRECTAL: CPT | Performed by: UROLOGY

## 2017-11-08 PROCEDURE — 99213 OFFICE O/P EST LOW 20 MIN: CPT | Mod: 25 | Performed by: UROLOGY

## 2017-11-08 PROCEDURE — 55700 ZZHC BIOPSY PROSTATE NEEDLE/PUNCH: CPT | Performed by: UROLOGY

## 2017-11-08 ASSESSMENT — PAIN SCALES - GENERAL: PAINLEVEL: NO PAIN (0)

## 2017-11-08 NOTE — NURSING NOTE
Chief Complaint   Patient presents with     Results     Patient is here for CTRU results     Deepak Cool LPN 8:15 AM November 8, 2017

## 2017-11-08 NOTE — PROGRESS NOTES
"History: This is a great pleasure to see this very pleasant 47-year-old gentleman in follow-up consultation today.  He, as we recall who presented with a elevated PSA, and a strong family history of prostate cancer.  Digital rectal examination had indicated a slight increase in texture on the right side as compared to the left.  We did a T3 MRI of the prostate, which indicated suspicious findings.  For that reason we went ahead with biopsies of the prostate.      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     Social History Main Topics     Smoking status: Never Smoker     Smokeless tobacco: Never Used     Alcohol use No     Drug use: No     Sexual activity: Yes     Partners: Female     Other Topics Concern     None     Social History Narrative       Past Surgical History:   Procedure Laterality Date     COLONOSCOPY  2007    benign colon polyps     HERNIORRHAPHY UMBILICAL N/A 7/15/2016    Procedure: HERNIORRHAPHY UMBILICAL;  Surgeon: Akil Granda MD;  Location: [facility]     LAPAROSCOPIC HERNIORRHAPHY INGUINAL BILATERAL Bilateral 7/15/2016    Procedure: LAPAROSCOPIC HERNIORRHAPHY INGUINAL BILATERAL;  Surgeon: Akil Granda MD;  Location: [facility]       Family History   Problem Relation Age of Onset     Cancer - colorectal Father      Prostate Cancer Father        No current outpatient prescriptions on file.    10 point ROS of systems including Constitutional, Eyes, Respiratory, Cardiovascular, Gastroenterology, Genitourinary, Integumentary, Muscularskeletal, Psychiatric were all negative except for pertinent positives noted in my HPI.    Examination:   BP (!) 120/100  Pulse 70  Ht 1.803 m (5' 11\")  Wt 90.7 kg (200 lb)  BMI 27.89 kg/m2  General Impression: Very pleasant " "gentleman in no acute distress, well-oriented in time place and person  Mental Status: Normal    Impression: At this time we do not have the final report on the biopsies from our pathologist, but I have had a discussion with the pathologist at this institution who have reviewed these slides very carefully and do not think there is evidence of cancer.  However they have sent the slides down to the [facility] for second opinion and at this point we do not have the opinion of the [facility] complete.  I have had a lengthy discussion with the patient today, I am reassured that our pathologists here feel that there is no evidence of cancer, and I will, as soon as I hear from the [facility], call him with their opinion.  , What may, however we will need to follow him closely given the strong family history of prostate cancer and I would recommend that we see him in 6 months for PSA and examination.  I discussed the entire situation with the patient in detail today.  I answered all his questions    Plan: 6 months for PSA and examination    Time: 20 minutes.  Greater than 50% spent in discussion and consultation    \"This dictation was performed with voice recognition software and may contain errors,  omissions and inadvertent word substitution.\"      "

## 2017-11-08 NOTE — MR AVS SNAPSHOT
"              After Visit Summary   11/8/2017    Charlie Zhang    MRN: [id number]           Patient Information     Date Of Birth          1970        Visit Information        Provider Department      11/8/2017 8:20 AM Chadd Marshall MD [facility]         Follow-ups after your visit        Follow-up notes from your care team     Return in about 6 months (around 5/8/2018) for Physical Exam, PSA.      Your next 10 appointments already scheduled     May 09, 2018  8:00 AM CDT   Return Visit with Chadd Marshall MD   [facility] ([facility])    [address]  [address]  [facility] [address] [phone]              Who to contact     If you have questions or need follow up information about today's clinic visit or your schedule please contact [facility] directly at [phone].  Normal or non-critical lab and imaging results will be communicated to you by TekStream Solutionshart, letter or phone within 4 business days after the clinic has received the results. If you do not hear from us within 7 days, please contact the clinic through TekStream Solutionshart or phone. If you have a critical or abnormal lab result, we will notify you by phone as soon as possible.  Submit refill requests through Timeful or call your pharmacy and they will forward the refill request to us. Please allow 3 business days for your refill to be completed.          Additional Information About Your Visit        TekStream Solutionshart Information     Timeful lets you send messages to your doctor, view your test results, renew your prescriptions, schedule appointments and more. To sign up, go to [email or web address] . Click on \"Log in\" on the left side of the screen, which will take you to the Welcome page. Then click on \"Sign up Now\" on the right side of the page.     You will be asked to enter the access code listed " "below, as well as some personal information. Please follow the directions to create your username and password.     Your access code is: [email or web address]  Expires: 2017  7:34 AM     Your access code will  in 90 days. If you need help or a new code, please call your [facility] or [phone].        Care EveryWhere ID     This is your Care EveryWhere ID. This could be used by other organizations to access your [place] medical records  [id number]        Your Vitals Were     Pulse Height BMI (Body Mass Index)             70 1.803 m (5' 11\") 27.89 kg/m2          Blood Pressure from Last 3 Encounters:   17 (!) 120/100   10/31/17 110/70   10/16/17 122/76    Weight from Last 3 Encounters:   17 90.7 kg (200 lb)   10/31/17 90.3 kg (199 lb)   10/16/17 90.7 kg (200 lb)              Today, you had the following     No orders found for display       Primary Care Provider Office Phone # Fax #    Russell Nur [phone]       [facility] [address] [facility] [address]  [facility] [address]        Equal Access to Services     ELKIN LOCK : Hadii faith ku hadasho Soomaali, waaxda luqadaha, qaybta kaalmada adeegyada, waxay ngoc haytyler lucio . So [facility] [phone].    ATENCIÓN: Si habla español, tiene a pritchett disposición servicios gratuitos de asistencia lingüística. Llame al [phone].    We comply with applicable federal civil rights laws and [place] laws. We do not discriminate on the basis of race, color, national origin, age, disability, sex, sexual orientation, or gender identity.            Thank you!     Thank you for choosing [facility] [place]  for your care. Our goal is always to provide you with excellent care. Hearing back from our patients is one way we can continue to improve our services. Please take a few minutes to complete the written survey that you may receive in the mail after your visit with us. Thank you!      "        Your Updated Medication List - Protect others around you: Learn how to safely use, store and throw away your medicines at www.disposemymeds.org.      Notice  As of 11/8/2017  8:52 AM    You have not been prescribed any medications.

## 2017-11-08 NOTE — LETTER
11/8/2017       RE: Charlie Zhang  [address] [facility] [address]     Dear Colleague,    Thank you for referring your patient, Charlie Zhang, to the [facility] [place] at [facility]. Please see a copy of my visit note below.    History: This is a great pleasure to see this very pleasant 47-year-old gentleman in follow-up consultation today.  He, as we recall who presented with a elevated PSA, and a strong family history of prostate cancer.  Digital rectal examination had indicated a slight increase in texture on the right side as compared to the left.  We did a T3 MRI of the prostate, which indicated suspicious findings.  For that reason we went ahead with biopsies of the prostate.      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     Social History Main Topics     Smoking status: Never Smoker     Smokeless tobacco: Never Used     Alcohol use No     Drug use: No     Sexual activity: Yes     Partners: Female     Other Topics Concern     None     Social History Narrative       Past Surgical History:   Procedure Laterality Date     COLONOSCOPY  2007    benign colon polyps     HERNIORRHAPHY UMBILICAL N/A 7/15/2016    Procedure: HERNIORRHAPHY UMBILICAL;  Surgeon: Akil Granda MD;  Location: [facility]     LAPAROSCOPIC HERNIORRHAPHY INGUINAL BILATERAL Bilateral 7/15/2016    Procedure: LAPAROSCOPIC HERNIORRHAPHY INGUINAL BILATERAL;  Surgeon: Akil Granda MD;  Location: [facility]       Family History   Problem Relation Age of Onset     Cancer - colorectal Father      Prostate Cancer Father        No current outpatient prescriptions on file.    10 point ROS of systems including Constitutional, Eyes,  "Respiratory, Cardiovascular, Gastroenterology, Genitourinary, Integumentary, Muscularskeletal, Psychiatric were all negative except for pertinent positives noted in my HPI.    Examination:   BP (!) 120/100  Pulse 70  Ht 1.803 m (5' 11\")  Wt 90.7 kg (200 lb)  BMI 27.89 kg/m2  General Impression: Very pleasant gentleman in no acute distress, well-oriented in time place and person  Mental Status: Normal    Impression: At this time we do not have the final report on the biopsies from our pathologist, but I have had a discussion with the pathologist at this institution who have reviewed these slides very carefully and do not think there is evidence of cancer.  However they have sent the slides down to the [facility] for second opinion and at this point we do not have the opinion of the [facility] complete.  I have had a lengthy discussion with the patient today, I am reassured that our pathologists here feel that there is no evidence of cancer, and I will, as soon as I hear from the [facility], call him with their opinion.  , What may, however we will need to follow him closely given the strong family history of prostate cancer and I would recommend that we see him in 6 months for PSA and examination.  I discussed the entire situation with the patient in detail today.  I answered all his questions    Plan: 6 months for PSA and examination    Time: 20 minutes.  Greater than 50% spent in discussion and consultation    \"This dictation was performed with voice recognition software and may contain errors,  omissions and inadvertent word substitution.\"        Again, thank you for allowing me to participate in the care of your patient.      Sincerely,    Chadd Marshall MD      "

## 2017-11-09 LAB — COPATH REPORT: NORMAL

## 2018-05-15 ENCOUNTER — OFFICE VISIT (OUTPATIENT)
Dept: LAB | Facility: SCHOOL | Age: 48
End: 2018-05-15
Payer: COMMERCIAL

## 2018-05-15 VITALS
HEART RATE: 71 BPM | RESPIRATION RATE: 16 BRPM | HEIGHT: 72 IN | WEIGHT: 203 LBS | DIASTOLIC BLOOD PRESSURE: 80 MMHG | OXYGEN SATURATION: 98 % | TEMPERATURE: 97.4 F | SYSTOLIC BLOOD PRESSURE: 120 MMHG | BODY MASS INDEX: 27.5 KG/M2

## 2018-05-15 DIAGNOSIS — Z00.00 ROUTINE GENERAL MEDICAL EXAMINATION AT A HEALTH CARE FACILITY: Primary | ICD-10-CM

## 2018-05-15 PROCEDURE — 99203 OFFICE O/P NEW LOW 30 MIN: CPT | Performed by: NURSE PRACTITIONER

## 2018-05-15 NOTE — PROGRESS NOTES
"  SUBJECTIVE:  CC: Charlie Zhang is an 47 year old woman who presents for Wellness Check at [facility].     Review of Healthy Lifestyle:    Do you get at least three servings of calcium containing foods daily (dairy, green leafy vegetables, etc.)? yes     Do you have a high-fiber diet? no     Amount of exercise or daily activities, outside of work: 5 day(s) per week    Do you wear sunscreen on a regular basis? Yes      Are you taking your medications regularly not applicable    Have you had an eye exam in the past two years? yes    Do you see a dentist twice per year? yes    Do you have sleep apnea, excessive snoring or excessive daytime drowsiness? no    Do you use tobacco in any form? no       OBJECTIVE:    Vitals: /80 (BP Location: Left arm, Patient Position: Chair, Cuff Size: Adult Large)  Pulse 71  Temp 97.4  F (36.3  C) (Tympanic)  Resp 16  Ht 5' 11.5\" (1.816 m)  Wt 203 lb (92.1 kg)  SpO2 98%  BMI 27.92 kg/m2  BMI= Body mass index is 27.92 kg/(m^2).    HEARING: Right Ear:        500Hz:  RESPONSE- on Level: 40 db   1000 Hz: RESPONSE- on Level:   20 db    2000 Hz: RESPONSE- on Level:   20 db    4000 Hz: RESPONSE- on Level:   20 db     Left Ear:       500Hz:  RESPONSE- on Level:   20 db    1000 Hz: RESPONSE- on Level:   20 db    2000 Hz: RESPONSE- on Level: 25 db   4000 Hz: RESPONSE- on Level:   20 db     VISION:  Right eye:  20/20  Left eye:     20/25  Both eyes: 20/20  Corrective lenses?  No    Medication Reconciliation: complete    ASSESSMENT/PLAN:    COUNSELING:      reports that he has never smoked. He has never used smokeless tobacco.    Estimated body mass index is 27.92 kg/(m^2) as calculated from the following:    Height as of this encounter: 5' 11.5\" (1.816 m).    Weight as of this encounter: 203 lb (92.1 kg).   Weight management plan: Patient was referred to their PCP to discuss a diet and exercise plan.    Counseling Resources  [place]'s " MyPlate  [email or web address]    PANDA [facility] ISD 83

## 2018-05-15 NOTE — MR AVS SNAPSHOT
After Visit Summary   5/15/2018    Charlie Zhang    MRN: [id number]           Patient Information     Date Of Birth          1970        Visit Information        Provider Department      5/15/2018 2:30 PM Jaye Hernandez APRN CNP [facility]        Today's Diagnoses     Routine general medical examination at a health care facility    -  1      Care Instructions                    Charlie Zhang has completed a Wellness Check at the [place] [address] [facility] on 5/15/2018.      ____________________________________________  PANDA Acosta CNP                                                                               Wellness Visit Recommendations:      See your regular primary care health provider every year in order to help stay healthy.    Review health changes.     Review your medicines if your doctor has prescribed any.    Talk to your provider about how often to have your cholesterol checked.    If you are at risk for diabetes, you should have a diabetes test (fasting glucose).    Shots: Get a flu shot each year. Get a tetanus shot every 10 years.     Review with your primary care provider other immunizations that you may need based on your age and/or medical/surgical history    Nutrition:     Eat at least 5 servings of fruits and vegetables each day.    Eat whole-grain bread, whole-wheat pasta and brown rice instead of white grains and rice.    Preventive Care to be reviewed by your primary care provider:    Females:        Cervical Cancer Screening                          Breast Cancer Screening                          Colon Cancer Screening  Males:             Prostrate Cancer Screening                          Colon Cancer Screening      Lifestyle:    Exercise at least 150 minutes a week (30 minutes a day, 5 days of the week). This will help you control your weight and help prevent disease or manage disease.    Limit alcohol  "to one drink per day or less depending on your past medical history.    No smoking.     Wear sunscreen to prevent skin cancer.    See your dentist every six months for an exam and cleaning.    Today's Vital Signs:  /80 (BP Location: Left arm, Patient Position: Chair, Cuff Size: Adult Large)  Pulse 71  Temp 97.4  F (36.3  C) (Tympanic)  Resp 16  Ht 5' 11.5\" (1.816 m)  Wt 203 lb (92.1 kg)  SpO2 98%  BMI 27.92 kg/m2          Follow-ups after your visit        Follow-up notes from your care team     Return if symptoms worsen or fail to improve.      Your next 10 appointments already scheduled     May 22, 2018 10:30 AM CDT   Return Visit with Chadd Marshall MD   [facility] [place] ([facility])    [address]  [address]  [place] [address] [phone]              Who to contact     If you have questions or need follow up information about today's clinic visit or your schedule please contact [place] [address] directly at [phone].  Normal or non-critical lab and imaging results will be communicated to you by MyChart, letter or phone within 4 business days after the clinic has received the results. If you do not hear from us within 7 days, please contact the clinic through Storifichart or phone. If you have a critical or abnormal lab result, we will notify you by phone as soon as possible.  Submit refill requests through ClassPass or call your pharmacy and they will forward the refill request to us. Please allow 3 business days for your refill to be completed.          Additional Information About Your Visit        Storifichart Information     ClassPass lets you send messages to your doctor, view your test results, renew your prescriptions, schedule appointments and more. To sign up, go to [email or web address] . Click on \"Log in\" on the left side of the screen, which will take you to the Welcome page. Then click on \"Sign up Now\" on " "the right side of the page.     You will be asked to enter the access code listed below, as well as some personal information. Please follow the directions to create your username and password.     Your access code is: [email or web address]  Expires: 2018  2:35 PM     Your access code will  in 90 days. If you need help or a new code, please call your [place] clinic or [phone].        Care EveryWhere ID     This is your Care EveryWhere ID. This could be used by other organizations to access your [place] medical records  [id number]        Your Vitals Were     Pulse Temperature Respirations Height Pulse Oximetry BMI (Body Mass Index)    71 97.4  F (36.3  C) (Tympanic) 16 5' 11.5\" (1.816 m) 98% 27.92 kg/m2       Blood Pressure from Last 3 Encounters:   05/15/18 120/80   17 (!) 120/100   10/31/17 110/70    Weight from Last 3 Encounters:   05/15/18 203 lb (92.1 kg)   17 200 lb (90.7 kg)   10/31/17 199 lb (90.3 kg)              Today, you had the following     No orders found for display       Primary Care Provider Office Phone # Fax #    Russell Nur [phone]       [facility] [address] [facility] [address]  [facility] [address] [address]        Equal Access to Services     [facility]: Hadii aad ku hadasho Soomaali, waaxda luqadaha, qaybta kaalmada adeegyada, vipul lucio . So [facility] [phone].    ATENCIÓN: Si habla español, tiene a pritchett disposición servicios gratuitos de asistencia lingüística. Llame al [phone].    We comply with applicable federal civil rights laws and [place] laws. We do not discriminate on the basis of race, color, national origin, age, disability, sex, sexual orientation, or gender identity.            Thank you!     Thank you for choosing [place] [address]  for your care. Our goal is always to provide you with excellent care. Hearing back from our patients is one way we can continue to improve our services. " Please take a few minutes to complete the written survey that you may receive in the mail after your visit with us. Thank you!             Your Updated Medication List - Protect others around you: Learn how to safely use, store and throw away your medicines at www.disposemymeds.org.      Notice  As of 5/15/2018  2:35 PM    You have not been prescribed any medications.

## 2018-05-15 NOTE — PATIENT INSTRUCTIONS
"                Charlie Zhang has completed a Wellness Check at the [facility] on 5/15/2018.      ____________________________________________  Jaye Hernandez, APRN CNP                                                                               Wellness Visit Recommendations:      See your regular primary care health provider every year in order to help stay healthy.    Review health changes.     Review your medicines if your doctor has prescribed any.    Talk to your provider about how often to have your cholesterol checked.    If you are at risk for diabetes, you should have a diabetes test (fasting glucose).    Shots: Get a flu shot each year. Get a tetanus shot every 10 years.     Review with your primary care provider other immunizations that you may need based on your age and/or medical/surgical history    Nutrition:     Eat at least 5 servings of fruits and vegetables each day.    Eat whole-grain bread, whole-wheat pasta and brown rice instead of white grains and rice.    Preventive Care to be reviewed by your primary care provider:    Females:        Cervical Cancer Screening                          Breast Cancer Screening                          Colon Cancer Screening  Males:             Prostrate Cancer Screening                          Colon Cancer Screening      Lifestyle:    Exercise at least 150 minutes a week (30 minutes a day, 5 days of the week). This will help you control your weight and help prevent disease or manage disease.    Limit alcohol to one drink per day or less depending on your past medical history.    No smoking.     Wear sunscreen to prevent skin cancer.    See your dentist every six months for an exam and cleaning.    Today's Vital Signs:  /80 (BP Location: Left arm, Patient Position: Chair, Cuff Size: Adult Large)  Pulse 71  Temp 97.4  F (36.3  C) (Tympanic)  Resp 16  Ht 5' 11.5\" (1.816 m)  Wt 203 lb (92.1 kg)  SpO2 98%  BMI " 27.92 kg/m2

## 2018-05-18 DIAGNOSIS — R97.20 ELEVATED PROSTATE SPECIFIC ANTIGEN (PSA): Primary | ICD-10-CM

## 2018-05-22 ENCOUNTER — OFFICE VISIT (OUTPATIENT)
Dept: UROLOGY | Facility: CLINIC | Age: 48
End: 2018-05-22
Payer: COMMERCIAL

## 2018-05-22 VITALS
WEIGHT: 200 LBS | HEART RATE: 87 BPM | SYSTOLIC BLOOD PRESSURE: 110 MMHG | OXYGEN SATURATION: 97 % | HEIGHT: 71 IN | DIASTOLIC BLOOD PRESSURE: 78 MMHG | BODY MASS INDEX: 28 KG/M2

## 2018-05-22 DIAGNOSIS — R97.20 ELEVATED PROSTATE SPECIFIC ANTIGEN (PSA): ICD-10-CM

## 2018-05-22 LAB — PSA SERPL-MCNC: 2.9 NG/ML (ref 0–4)

## 2018-05-22 PROCEDURE — 84153 ASSAY OF PSA TOTAL: CPT | Performed by: UROLOGY

## 2018-05-22 PROCEDURE — 99213 OFFICE O/P EST LOW 20 MIN: CPT | Performed by: UROLOGY

## 2018-05-22 PROCEDURE — 36415 COLL VENOUS BLD VENIPUNCTURE: CPT | Performed by: UROLOGY

## 2018-05-22 ASSESSMENT — PAIN SCALES - GENERAL: PAINLEVEL: NO PAIN (0)

## 2018-05-22 NOTE — MR AVS SNAPSHOT
"              After Visit Summary   2018    Charlie Zhang    MRN: [id number]           Patient Information     Date Of Birth          1970        Visit Information        Provider Department      2018 10:30 AM Chadd Mrashall MD [facility]        Today's Diagnoses     Elevated prostate specific antigen (PSA)           Follow-ups after your visit        Follow-up notes from your care team     Return in about 1 year (around 2019) for Physical Exam, PSA.      Who to contact     If you have questions or need follow up information about today's clinic visit or your schedule please contact [facility] directly at [phone].  Normal or non-critical lab and imaging results will be communicated to you by MyChart, letter or phone within 4 business days after the clinic has received the results. If you do not hear from us within 7 days, please contact the clinic through [place] Rightshart or phone. If you have a critical or abnormal lab result, we will notify you by phone as soon as possible.  Submit refill requests through Inventorum or call your pharmacy and they will forward the refill request to us. Please allow 3 business days for your refill to be completed.          Additional Information About Your Visit        MyChart Information     Inventorum lets you send messages to your doctor, view your test results, renew your prescriptions, schedule appointments and more. To sign up, go to [email or web address] . Click on \"Log in\" on the left side of the screen, which will take you to the Welcome page. Then click on \"Sign up Now\" on the right side of the page.     You will be asked to enter the access code listed below, as well as some personal information. Please follow the directions to create your username and password.     Your access code is: [email or web address]  Expires: 2018  2:35 PM     Your access code will  in " "90 days. If you need help or a new code, please call your [place] clinic or [phone].        Care EveryWhere ID     This is your Care EveryWhere ID. This could be used by other organizations to access your [place] medical records  [id number]        Your Vitals Were     Pulse Height Pulse Oximetry BMI (Body Mass Index)          87 1.803 m (5' 11\") 97% 27.89 kg/m2         Blood Pressure from Last 3 Encounters:   05/22/18 110/78   05/15/18 120/80   11/08/17 (!) 120/100    Weight from Last 3 Encounters:   05/22/18 90.7 kg (200 lb)   05/15/18 92.1 kg (203 lb)   11/08/17 90.7 kg (200 lb)              We Performed the Following     PSA Diag Urologic Phys        Primary Care Provider Office Phone # Fax #    Russell Nur [phone]       [facility] [address]   [facility] [address]        Equal Access to Services     [facility]: Hadii aad ku hadasho Soomaali, waaxda luqadaha, qaybta kaalmada adeegyada, waxay idiin haygómezn aravind lucio . So [facility] [phone].    ATENCIÓN: Si habla español, tiene a pritchett disposición servicios gratuitos de asistencia lingüística. Ll[facility] [phone].    We comply with applicable federal civil rights laws and [place] laws. We do not discriminate on the basis of race, color, national origin, age, disability, sex, sexual orientation, or gender identity.            Thank you!     Thank you for choosing [facility] [place]  for your care. Our goal is always to provide you with excellent care. Hearing back from our patients is one way we can continue to improve our services. Please take a few minutes to complete the written survey that you may receive in the mail after your visit with us. Thank you!             Your Updated Medication List - Protect others around you: Learn how to safely use, store and throw away your medicines at www.disposemymeds.org.          This list is accurate as of 5/22/18 11:36 AM.  " Always use your most recent med list.                   Brand Name Dispense Instructions for use Diagnosis    ALLEGRA ALLERGY PO

## 2018-05-22 NOTE — PROGRESS NOTES
History: It is a great pleasure to see this very pleasant 47-year-old gentleman in follow-up consultation today.  We recall he had a history of an elevated PSA up to 5.36, with a family history of prostate cancer in his father, subsequently had MRI of the prostate, showing evidence of PIRADS 4 findings and as a result of this he had a careful biopsies of the prostate performed.  Fortunately all these biopsies were benign.  Since then we will continue to monitor his progress,.  I am very glad that today the PSA is only 2.9.  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     Social History Main Topics     Smoking status: Never Smoker     Smokeless tobacco: Never Used     Alcohol use No     Drug use: No     Sexual activity: Yes     Partners: Female     Other Topics Concern     None     Social History Narrative       Past Surgical History:   Procedure Laterality Date     COLONOSCOPY  2007    benign colon polyps     HERNIORRHAPHY UMBILICAL N/A 7/15/2016    Procedure: HERNIORRHAPHY UMBILICAL;  Surgeon: Akil Granda MD;  Location: [facility]     LAPAROSCOPIC HERNIORRHAPHY INGUINAL BILATERAL Bilateral 7/15/2016    Procedure: LAPAROSCOPIC HERNIORRHAPHY INGUINAL BILATERAL;  Surgeon: Akil Granda MD;  Location: [facility]       Family History   Problem Relation Age of Onset     Cancer - colorectal Father      Prostate Cancer Father          Current Outpatient Prescriptions:      Fexofenadine HCl (ALLEGRA ALLERGY PO), , Disp: , Rfl:     10 point ROS of systems including Constitutional, Eyes, Respiratory, Cardiovascular, Gastroenterology, Genitourinary, Integumentary, Muscularskeletal, Psychiatric were all negative  "except for pertinent positives noted in my HPI.    Examination:   /78 (BP Location: Left arm, Patient Position: Sitting, Cuff Size: Adult Regular)  Pulse 87  Ht 1.803 m (5' 11\")  Wt 90.7 kg (200 lb)  SpO2 97%  BMI 27.89 kg/m2  General Impression: Very pleasant gentleman in no acute distress, well oriented in time place and person.    Mental Status: Normal.  HEENT.  There is no clinical evidence of jaundice and the mucous membrane is normal  Skin: Skin is otherwise normal to examination  Respiratory System: Respiratory cycle is normal  Lymph Nodes: Not examined  Back/Flank Tenderness: Not examined  Cardiovascular System: Not examined  Abdominal Examination: Not examined  Extremities: No significant peripheral edema  Genitial: Not examined  Rectal Examination: Good sphincter tone, normal perianal sensation.  Smooth rectal mucosa without hemorrhoids or fissures.  Smooth soft normal size prostate without evidence of tenderness, bogginess or nodules.  Minimal vesicles.  Not palpable.  Perineum is otherwise normal to examination  Neurologic System: There are no focal abnormal clinical neurological signs in the central, peripheral nervous systems    Impression: The situation is very stable.  The PSA has returned to his baseline and is now 2.9, digital rectal examination is unremarkable.  I am very pleased with his progress.  We will, however, because of the family history of prostate cancer continue to follow him closely and I would like to repeat PSA and clinical examination in 1 year.  I did discuss the entire situation very carefully with the patient in detail today.  I answered all his questions    Plan: 1 year for PSA and examination    Time: 20 minutes with greater than 50% in discussion and consultation    \"This dictation was performed with voice recognition software and may contain errors,  omissions and inadvertent word substitution.\"      "

## 2018-05-22 NOTE — NURSING NOTE
Chief Complaint   Patient presents with     Clinic Care Coordination - Follow-up     Pt here for same day PSA     Roxanne Caicedo, RICK

## 2018-05-22 NOTE — LETTER
5/22/2018       RE: Charlie Zhang  [address] [facility] [address]     Dear Colleague,    Thank you for referring your patient, Charlie Zhang, to the [facility] [place] [facility]. Please see a copy of my visit note below.    History: It is a great pleasure to see this very pleasant 47-year-old gentleman in follow-up consultation today.  We recall he had a history of an elevated PSA up to 5.36, with a family history of prostate cancer in his father, subsequently had MRI of the prostate, showing evidence of PIRADS 4 findings and as a result of this he had a careful biopsies of the prostate performed.  Fortunately all these biopsies were benign.  Since then we will continue to monitor his progress,.  I am very glad that today the PSA is only 2.9.  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     Social History Main Topics     Smoking status: Never Smoker     Smokeless tobacco: Never Used     Alcohol use No     Drug use: No     Sexual activity: Yes     Partners: Female     Other Topics Concern     None     Social History Narrative       Past Surgical History:   Procedure Laterality Date     COLONOSCOPY  2007    benign colon polyps     HERNIORRHAPHY UMBILICAL N/A 7/15/2016    Procedure: HERNIORRHAPHY UMBILICAL;  Surgeon: Akil Granda MD;  Location: [facility]     LAPAROSCOPIC HERNIORRHAPHY INGUINAL BILATERAL Bilateral 7/15/2016    Procedure: LAPAROSCOPIC HERNIORRHAPHY INGUINAL BILATERAL;  Surgeon: Akil Granda MD;  Location: [facility]       Family History   Problem Relation Age of Onset     Cancer - colorectal Father       "Prostate Cancer Father          Current Outpatient Prescriptions:      Fexofenadine HCl (ALLEGRA ALLERGY PO), , Disp: , Rfl:     10 point ROS of systems including Constitutional, Eyes, Respiratory, Cardiovascular, Gastroenterology, Genitourinary, Integumentary, Muscularskeletal, Psychiatric were all negative except for pertinent positives noted in my HPI.    Examination:   /78 (BP Location: Left arm, Patient Position: Sitting, Cuff Size: Adult Regular)  Pulse 87  Ht 1.803 m (5' 11\")  Wt 90.7 kg (200 lb)  SpO2 97%  BMI 27.89 kg/m2  General Impression: Very pleasant gentleman in no acute distress, well oriented in time place and person.    Mental Status: Normal.  HEENT.  There is no clinical evidence of jaundice and the mucous membrane is normal  Skin: Skin is otherwise normal to examination  Respiratory System: Respiratory cycle is normal  Lymph Nodes: Not examined  Back/Flank Tenderness: Not examined  Cardiovascular System: Not examined  Abdominal Examination: Not examined  Extremities: No significant peripheral edema  Genitial: Not examined  Rectal Examination: Good sphincter tone, normal perianal sensation.  Smooth rectal mucosa without hemorrhoids or fissures.  Smooth soft normal size prostate without evidence of tenderness, bogginess or nodules.  Minimal vesicles.  Not palpable.  Perineum is otherwise normal to examination  Neurologic System: There are no focal abnormal clinical neurological signs in the central, peripheral nervous systems    Impression: The situation is very stable.  The PSA has returned to his baseline and is now 2.9, digital rectal examination is unremarkable.  I am very pleased with his progress.  We will, however, because of the family history of prostate cancer continue to follow him closely and I would like to repeat PSA and clinical examination in 1 year.  I did discuss the entire situation very carefully with the patient in detail today.  I answered all his questions    Plan: " "1 year for PSA and examination    Time: 20 minutes with greater than 50% in discussion and consultation    \"This dictation was performed with voice recognition software and may contain errors,  omissions and inadvertent word substitution.\"        Again, thank you for allowing me to participate in the care of your patient.      Sincerely,    Chadd Marshall MD      "

## 2019-10-03 ENCOUNTER — OFFICE VISIT (OUTPATIENT)
Dept: SURGERY | Facility: CLINIC | Age: 49
End: 2019-10-03
Payer: COMMERCIAL

## 2019-10-03 VITALS
HEIGHT: 71 IN | DIASTOLIC BLOOD PRESSURE: 78 MMHG | SYSTOLIC BLOOD PRESSURE: 114 MMHG | BODY MASS INDEX: 27.58 KG/M2 | HEART RATE: 66 BPM | WEIGHT: 197 LBS

## 2019-10-03 DIAGNOSIS — R10.31 RIGHT GROIN PAIN: Primary | ICD-10-CM

## 2019-10-03 PROCEDURE — 99214 OFFICE O/P EST MOD 30 MIN: CPT | Performed by: SURGERY

## 2019-10-03 ASSESSMENT — MIFFLIN-ST. JEOR: SCORE: 1780.72

## 2019-10-05 NOTE — PROGRESS NOTES
"[facility]  Surgery Consultation    Primary care provider:  Liudmila Russell B [phone]    HPI: Patient is a 48-year-old gentleman known to me from prior laparoscopic bilateral inguinal hernia repair in 2016.  He presents now as a self-referral to evaluate possible recurrent right inguinal hernia.  He states that around a month and a half ago he began to experience some mild discomfort on his right side.  He attributed this to fair amount of lifting that he done of boxes at the state [facility].  He reports that the pain is been somewhat intermittent.  He has had periods where he is pain-free.  He is been able to continue to exercise including running and elliptical machine.  He has had no pain with coughing or sneezing.  He has not noted a recurrent bulge.  He has had no GI symptoms.    PMH:   has a past medical history of Hernia, abdominal.  PSH:    has a past surgical history that includes colonoscopy (2007); Laparoscopic herniorrhaphy inguinal bilateral (Bilateral, 7/15/2016); and Herniorrhaphy umbilical (N/A, 7/15/2016).  Social History:   reports that he has never smoked. He has never used smokeless tobacco. He reports that he does not drink alcohol or use drugs.  Family History:  family history includes Cancer - colorectal in his father; Prostate Cancer in his father.  Medications/Allergies: Home medications and allergies reviewed.    ROS:  The 10 point Review of Systems is negative other than noted in the HPI.    Physical Exam:  /78   Pulse 66   Ht 1.803 m (5' 11\")   Wt 89.4 kg (197 lb)   BMI 27.48 kg/m    GENERAL: Generally appears well.  Psych: Alert and Oriented.  Normal affect  Eyes: Sclera clear  Respiratory:  Lungs clear to ausculation bilaterally with good air excursion  Cardiovascular:  Regular Rate and Rhythm with no murmurs gallops or rubs, normal peripheral pulses  GI: Abdomen Non Distended Non-Tender  No hernias palpated..  Groin- I examined the patient in both the " standing and supine positions. Right Groin- No hernia Palpated.  Tenderness palpated in groin. Left Groin- No hernia Palpated.  No tenderness on palpation. No scrotal or testicle abnormalities.  Lymphatic/Hematologic/Immune:  No femoral or cervical lymphadenopathy.  Integumentary:  No rashes  Neurological: grossly intact     All new lab and imaging data was reviewed.     Impression and Plan:  Patient is a 48 year old male with right groin pain with prior history of laparoscopic inguinal hernia repair, no evidence of recurrence at this time.    PLAN: Options were discussed the patient.  Presently I recommend physical therapy once a week for 6 weeks.  He was instructed to represent should his symptoms worsen.  If he has the development of a recurrent bulge also to come back.  Otherwise may follow-up on an as-needed basis.  I discussed the pathophysiology of hernias and options for repair including laparoscopic VS open.  The risks associated with the procedure including, but not limited to, recurrence, nerve entrapment or injury, persistence of pain, injury to the bowel/bladder, infertility, hematoma, mesh migration, mesh infection, MI, and PE were discussed with the patient. He indicated understanding of the discussion, asked appropriate questions, and provided consent. Signs and symptoms of incarceration were discussed. If these develop in the interim, he promises to call or go straight to the ER. I have provided the patient with an information pamphlet.    Thank you very much for this consult.    Akil Granda M.D.  [facility]  [phone]    Please route or send letter to:  Primary Care Provider (PCP) and Referring Provider

## 2020-02-08 ENCOUNTER — HEALTH MAINTENANCE LETTER (OUTPATIENT)
Age: 50
End: 2020-02-08

## 2020-11-07 ENCOUNTER — HEALTH MAINTENANCE LETTER (OUTPATIENT)
Age: 50
End: 2020-11-07

## 2020-12-04 ENCOUNTER — OFFICE VISIT (OUTPATIENT)
Dept: LAB | Facility: SCHOOL | Age: 50
End: 2020-12-04

## 2020-12-04 VITALS
WEIGHT: 217.4 LBS | HEIGHT: 72 IN | OXYGEN SATURATION: 96 % | DIASTOLIC BLOOD PRESSURE: 76 MMHG | TEMPERATURE: 97.6 F | SYSTOLIC BLOOD PRESSURE: 116 MMHG | HEART RATE: 80 BPM | BODY MASS INDEX: 29.45 KG/M2

## 2020-12-04 DIAGNOSIS — Z00.00 WELLNESS EXAMINATION: Primary | ICD-10-CM

## 2020-12-04 LAB
CHOLEST SERPL-MCNC: 188 MG/DL
GLUCOSE SERPL-MCNC: 98 MG/DL (ref 70–99)
HDLC SERPL-MCNC: 37 MG/DL
LDLC SERPL CALC-MCNC: 99 MG/DL
NONHDLC SERPL-MCNC: 151 MG/DL
TRIGL SERPL-MCNC: 260 MG/DL

## 2020-12-04 PROCEDURE — 80061 LIPID PANEL: CPT | Performed by: NURSE PRACTITIONER

## 2020-12-04 PROCEDURE — 99203 OFFICE O/P NEW LOW 30 MIN: CPT

## 2020-12-04 PROCEDURE — 36415 COLL VENOUS BLD VENIPUNCTURE: CPT | Performed by: NURSE PRACTITIONER

## 2020-12-04 PROCEDURE — 82947 ASSAY GLUCOSE BLOOD QUANT: CPT

## 2020-12-04 ASSESSMENT — MIFFLIN-ST. JEOR: SCORE: 1879.12

## 2020-12-04 NOTE — PROGRESS NOTES
SUBJECTIVE:  CC: Charlie Zhang is an 50 year old woman who presents for Wellness Check at [facility] [id number].     Review of Healthy Lifestyle:    Do you get at least three servings of calcium containing foods daily (dairy, green leafy vegetables, etc.)? yes     Do you have a high-fiber diet? yes     Amount of exercise or daily activities, outside of work: 4 day(s) per week, gym's    Do you wear sunscreen on a regular basis? Yes     Are you taking your medications regularly not applicable    Have you had an eye exam in the past two years? Yes    Do you see a dentist twice per year? yes    Do you have sleep apnea, excessive snoring or excessive daytime drowsiness? no    Do you use tobacco in any form? no       OBJECTIVE:    Vitals: /76 (BP Location: Right arm, Patient Position: Sitting, Cuff Size: Adult Regular)   Pulse 80   Temp 97.6  F (36.4  C) (Tympanic)   Ht 1.829 m (6')   Wt 98.6 kg (217 lb 6.4 oz)   SpO2 96%   BMI 29.48 kg/m    BMI= Body mass index is 29.48 kg/m .    HEARING: Right Ear:        500Hz:  RESPONSE- on Level:   20 db    1000 Hz: RESPONSE- on Level:   20 db    2000 Hz: RESPONSE- on Level:   20 db    4000 Hz: RESPONSE- on Level:   20 db     Left Ear:       500Hz:  RESPONSE- on Level:   20 db    1000 Hz: RESPONSE- on Level:   20 db    2000 Hz: RESPONSE- on Level:   20 db    4000 Hz: RESPONSE- on Level:   20 db     VISION:  Right eye:  declined  Left eye:     declined  Both eyes: declined  Corrective lenses?  No    Medication Reconciliation: complete    ASSESSMENT/PLAN:    Flu: is going to get it through work  Tdap: up to date  Labs: labs drawn today      COUNSELING:      reports that he has never smoked. He has never used smokeless tobacco.    Estimated body mass index is 29.48 kg/m  as calculated from the following:    Height as of this encounter: 1.829 m (6').    Weight as of this encounter: 98.6 kg (217 lb 6.4 oz).   Weight management plan: Discussed  healthy diet and exercise guidelines    Counseling Resources  USDA's MyPlate  https://www.quitplan.com/    [place] SCHOOL PROVIDER  [facility]

## 2020-12-04 NOTE — PATIENT INSTRUCTIONS
Naderjames Zhang has completed a Wellness Check at the [facility] on 12/4/2020.      ____________________________________________  [place] SCHOOL PROVIDER                                                                               Wellness Visit Recommendations:      See your regular primary care health provider every year in order to help stay healthy.    Review health changes.     Review your medicines if your doctor has prescribed any.    Talk to your provider about how often to have your cholesterol checked.    If you are at risk for diabetes, you should have a diabetes test (fasting glucose).    Shots: Get a flu shot each year. Get a tetanus shot every 10 years.     Review with your primary care provider other immunizations that you may need based on your age and/or medical/surgical history    Nutrition:     Eat at least 5 servings of fruits and vegetables each day.    Eat whole-grain bread, whole-wheat pasta and brown rice instead of white grains and rice.    Preventive Care to be reviewed by your primary care provider:      Males:             Prostrate Cancer Screening                          Colon Cancer Screening      Lifestyle:    Exercise at least 150 minutes a week (30 minutes a day, 5 days of the week). This will help you control your weight and help prevent disease or manage disease.    Limit alcohol to one drink per day or less depending on your past medical history.    No smoking.     Wear sunscreen to prevent skin cancer.    See your dentist every six months for an exam and cleaning.    Today's Vital Signs:  /76 (BP Location: Right arm, Patient Position: Sitting, Cuff Size: Adult Regular)   Pulse 80   Temp 97.6  F (36.4  C) (Tympanic)   Ht 1.829 m (6')   Wt 98.6 kg (217 lb 6.4 oz)   SpO2 96%   BMI 29.48 kg/m

## 2021-01-28 ENCOUNTER — IMMUNIZATION (OUTPATIENT)
Dept: NURSING | Facility: CLINIC | Age: 51
End: 2021-01-28
Payer: COMMERCIAL

## 2021-01-28 PROCEDURE — 0001A PR COVID VAC PFIZER DIL RECON 30 MCG/0.3 ML IM: CPT

## 2021-01-28 PROCEDURE — 91300 PR COVID VAC PFIZER DIL RECON 30 MCG/0.3 ML IM: CPT

## 2021-02-18 ENCOUNTER — IMMUNIZATION (OUTPATIENT)
Dept: NURSING | Facility: CLINIC | Age: 51
End: 2021-02-18
Attending: PSYCHIATRY & NEUROLOGY
Payer: COMMERCIAL

## 2021-02-18 PROCEDURE — 0002A PR COVID VAC PFIZER DIL RECON 30 MCG/0.3 ML IM: CPT

## 2021-02-18 PROCEDURE — 91300 PR COVID VAC PFIZER DIL RECON 30 MCG/0.3 ML IM: CPT

## 2021-03-27 ENCOUNTER — HEALTH MAINTENANCE LETTER (OUTPATIENT)
Age: 51
End: 2021-03-27

## 2021-09-05 ENCOUNTER — HEALTH MAINTENANCE LETTER (OUTPATIENT)
Age: 51
End: 2021-09-05

## 2022-02-08 ENCOUNTER — OFFICE VISIT (OUTPATIENT)
Dept: FAMILY MEDICINE | Facility: CLINIC | Age: 52
End: 2022-02-08
Payer: COMMERCIAL

## 2022-02-08 VITALS
HEART RATE: 61 BPM | WEIGHT: 218 LBS | DIASTOLIC BLOOD PRESSURE: 82 MMHG | BODY MASS INDEX: 29.57 KG/M2 | OXYGEN SATURATION: 97 % | TEMPERATURE: 97 F | SYSTOLIC BLOOD PRESSURE: 126 MMHG

## 2022-02-08 DIAGNOSIS — Z00.00 ROUTINE GENERAL MEDICAL EXAMINATION AT A HEALTH CARE FACILITY: Primary | ICD-10-CM

## 2022-02-08 DIAGNOSIS — Z23 HIGH PRIORITY FOR 2019-NCOV VACCINE: ICD-10-CM

## 2022-02-08 DIAGNOSIS — R73.09 ABNORMAL GLUCOSE: ICD-10-CM

## 2022-02-08 DIAGNOSIS — Z23 NEED FOR DTAP VACCINATION: ICD-10-CM

## 2022-02-08 DIAGNOSIS — Z12.5 SCREENING FOR PROSTATE CANCER: ICD-10-CM

## 2022-02-08 DIAGNOSIS — Z12.11 SCREEN FOR COLON CANCER: ICD-10-CM

## 2022-02-08 DIAGNOSIS — Z23 NEED FOR SHINGLES VACCINE: ICD-10-CM

## 2022-02-08 DIAGNOSIS — Z13.6 CARDIOVASCULAR SCREENING; LDL GOAL LESS THAN 160: ICD-10-CM

## 2022-02-08 DIAGNOSIS — Z23 NEED FOR COVID-19 VACCINE: ICD-10-CM

## 2022-02-08 DIAGNOSIS — E66.3 OVERWEIGHT (BMI 25.0-29.9): ICD-10-CM

## 2022-02-08 DIAGNOSIS — R97.20 ELEVATED PROSTATE SPECIFIC ANTIGEN (PSA): ICD-10-CM

## 2022-02-08 LAB
ANION GAP SERPL CALCULATED.3IONS-SCNC: <1 MMOL/L (ref 3–14)
BUN SERPL-MCNC: 16 MG/DL (ref 7–30)
CALCIUM SERPL-MCNC: 9.2 MG/DL (ref 8.5–10.1)
CHLORIDE BLD-SCNC: 106 MMOL/L (ref 94–109)
CHOLEST SERPL-MCNC: 197 MG/DL
CO2 SERPL-SCNC: 34 MMOL/L (ref 20–32)
CREAT SERPL-MCNC: 1.19 MG/DL (ref 0.66–1.25)
FASTING STATUS PATIENT QL REPORTED: YES
GFR SERPL CREATININE-BSD FRML MDRD: 73 ML/MIN/1.73M2
GLUCOSE BLD-MCNC: 109 MG/DL (ref 70–99)
HDLC SERPL-MCNC: 35 MG/DL
LDLC SERPL CALC-MCNC: 115 MG/DL
NONHDLC SERPL-MCNC: 162 MG/DL
POTASSIUM BLD-SCNC: 5 MMOL/L (ref 3.4–5.3)
PSA SERPL-MCNC: 5.18 UG/L (ref 0–4)
SODIUM SERPL-SCNC: 139 MMOL/L (ref 133–144)
TRIGL SERPL-MCNC: 234 MG/DL

## 2022-02-08 PROCEDURE — 36415 COLL VENOUS BLD VENIPUNCTURE: CPT | Performed by: FAMILY MEDICINE

## 2022-02-08 PROCEDURE — G0103 PSA SCREENING: HCPCS | Performed by: FAMILY MEDICINE

## 2022-02-08 PROCEDURE — 99386 PREV VISIT NEW AGE 40-64: CPT | Mod: 25 | Performed by: FAMILY MEDICINE

## 2022-02-08 PROCEDURE — 90715 TDAP VACCINE 7 YRS/> IM: CPT | Performed by: FAMILY MEDICINE

## 2022-02-08 PROCEDURE — 90472 IMMUNIZATION ADMIN EACH ADD: CPT | Performed by: FAMILY MEDICINE

## 2022-02-08 PROCEDURE — 90750 HZV VACC RECOMBINANT IM: CPT | Performed by: FAMILY MEDICINE

## 2022-02-08 PROCEDURE — 90471 IMMUNIZATION ADMIN: CPT | Performed by: FAMILY MEDICINE

## 2022-02-08 PROCEDURE — 91306 COVID-19,PF,MODERNA (18+ YRS BOOSTER .25ML): CPT | Performed by: FAMILY MEDICINE

## 2022-02-08 PROCEDURE — 99213 OFFICE O/P EST LOW 20 MIN: CPT | Mod: 25 | Performed by: FAMILY MEDICINE

## 2022-02-08 PROCEDURE — 80048 BASIC METABOLIC PNL TOTAL CA: CPT | Performed by: FAMILY MEDICINE

## 2022-02-08 PROCEDURE — 0064A COVID-19,PF,MODERNA (18+ YRS BOOSTER .25ML): CPT | Performed by: FAMILY MEDICINE

## 2022-02-08 PROCEDURE — 80061 LIPID PANEL: CPT | Performed by: FAMILY MEDICINE

## 2022-02-08 RX ORDER — SULFAMETHOXAZOLE/TRIMETHOPRIM 800-160 MG
1 TABLET ORAL 2 TIMES DAILY
Qty: 20 TABLET | Refills: 0 | Status: SHIPPED | OUTPATIENT
Start: 2022-02-08 | End: 2022-02-18

## 2022-02-08 ASSESSMENT — ENCOUNTER SYMPTOMS
DYSURIA: 0
EYE PAIN: 0
MYALGIAS: 0
FREQUENCY: 0
JOINT SWELLING: 0
COUGH: 0
SHORTNESS OF BREATH: 0
PALPITATIONS: 0
ABDOMINAL PAIN: 0
DIARRHEA: 0
PARESTHESIAS: 0
DIZZINESS: 0
ARTHRALGIAS: 0
CONSTIPATION: 0
HEADACHES: 0
WEAKNESS: 0
SORE THROAT: 0
FEVER: 0
HEMATOCHEZIA: 0
NAUSEA: 0
NERVOUS/ANXIOUS: 0
HEMATURIA: 0
HEARTBURN: 0
CHILLS: 0

## 2022-02-08 NOTE — PATIENT INSTRUCTIONS
Preventive Health Recommendations  Male Ages 50 - 64    *  Shots today: Covid booster, Adacel, first shingles shot.   *  Blood tests today: PSA, cholesterol, kidney function.   *  Our office will contact you about a colonoscopy: If you have not heard from the scheduling office within 2 business days, please call [phone].  *   Yearly check up.     Yearly exam:             See your health care provider every year in order to  o   Review health changes.   o   Discuss preventive care.    o   Review your medicines if your doctor has prescribed any.     Have a cholesterol test every 5 years, or more frequently if you are at risk for high cholesterol/heart disease.     Have a diabetes test (fasting glucose) every three years. If you are at risk for diabetes, you should have this test more often.     Have a colonoscopy at age 50, or have a yearly FIT test (stool test). These exams will check for colon cancer.      Talk with your health care provider about whether or not a prostate cancer screening test (PSA) is right for you.    You should be tested each year for STDs (sexually transmitted diseases), if you re at risk.     Shots: Get a flu shot each year. Get a tetanus shot every 10 years.     Nutrition:    Eat at least 5 servings of fruits and vegetables daily.     Eat whole-grain bread, whole-wheat pasta and brown rice instead of white grains and rice.     Get adequate Calcium and Vitamin D.     Lifestyle    Exercise for at least 150 minutes a week (30 minutes a day, 5 days a week). This will help you control your weight and prevent disease.     Limit alcohol to one drink per day.     No smoking.     Wear sunscreen to prevent skin cancer.     See your dentist every six months for an exam and cleaning.     See your eye doctor every 1 to 2 years.

## 2022-02-08 NOTE — PROGRESS NOTES
SUBJECTIVE:   CC: Charlie Zhang is an 51 year old male who presents for preventative health visit.       Patient has been advised of split billing requirements and indicates understanding: Yes  Healthy Habits:     Getting at least 3 servings of Calcium per day:  NO    Bi-annual eye exam:  Yes    Dental care twice a year:  Yes    Sleep apnea or symptoms of sleep apnea:  None    Diet:  Regular (no restrictions)    Frequency of exercise:  4-5 days/week    Duration of exercise:  30-45 minutes    Taking medications regularly:  Yes    Medication side effects:  None    PHQ-2 Total Score: 0    Additional concerns today:  No        Today's PHQ-2 Score:   PHQ-2 ( 1999 Pfizer) 2/8/2022   Q1: Little interest or pleasure in doing things 0   Q2: Feeling down, depressed or hopeless 0   PHQ-2 Score 0   Q1: Little interest or pleasure in doing things Not at all   Q2: Feeling down, depressed or hopeless Not at all   PHQ-2 Score 0       Abuse: Current or Past(Physical, Sexual or Emotional)- No  Do you feel safe in your environment? Yes    Have you ever done Advance Care Planning? (For example, a Health Directive, POLST, or a discussion with a medical provider or your loved ones about your wishes): No, advance care planning information given to patient to review.  Patient declined advance care planning discussion at this time.    Social History     Tobacco Use     Smoking status: Never Smoker     Smokeless tobacco: Never Used   Substance Use Topics     Alcohol use: No         Alcohol Use 2/8/2022   Prescreen: >3 drinks/day or >7 drinks/week? Not Applicable       Last PSA: No results found for: PSA    Reviewed orders with patient. Reviewed health maintenance and updated orders accordingly - Yes  Labs reviewed in EPIC    Reviewed and updated as needed this visit by clinical staff  Tobacco  Allergies  Meds             Reviewed and updated as needed this visit by Provider                   Review of Systems   Constitutional:  Negative for chills and fever.   HENT: Negative for congestion, ear pain, hearing loss and sore throat.    Eyes: Negative for pain and visual disturbance.   Respiratory: Negative for cough and shortness of breath.    Cardiovascular: Negative for chest pain, palpitations and peripheral edema.   Gastrointestinal: Negative for abdominal pain, constipation, diarrhea, heartburn, hematochezia and nausea.   Genitourinary: Negative for dysuria, frequency, genital sores, hematuria, impotence, penile discharge and urgency.   Musculoskeletal: Negative for arthralgias, joint swelling and myalgias.   Skin: Negative for rash.   Neurological: Negative for dizziness, weakness, headaches and paresthesias.   Psychiatric/Behavioral: Negative for mood changes. The patient is not nervous/anxious.      OBJECTIVE:   /82 (BP Location: Left arm, Patient Position: Chair, Cuff Size: Adult Large)   Pulse 61   Temp 97  F (36.1  C) (Tympanic)   Wt 98.9 kg (218 lb)   SpO2 97%   BMI 29.57 kg/m      Physical Exam  GENERAL: Healthy, alert and no distress  EYES: Eyes grossly normal to inspection, conjunctivae and sclerae normal  RESP: Lungs clear to auscultation - no rales, rhonchi or wheezes  CV: Regular rate and rhythm, normal S1 S2, no murmur  MS: No gross musculoskeletal defects noted, no edema  NEURO: Normal strength and tone, mentation intact and speech normal  PSYCH: Mentation appears normal, affect normal/bright     Diagnostic Test Results:  Labs reviewed in Epic  Results for orders placed or performed in visit on 02/08/22 (from the past 24 hour(s))   PSA, screen   Result Value Ref Range    Prostate Specific Antigen Screen 5.18 (H) 0.00 - 4.00 ug/L   Lipid panel reflex to direct LDL Fasting   Result Value Ref Range    Cholesterol 197 <200 mg/dL    Triglycerides 234 (H) <150 mg/dL    Direct Measure HDL 35 (L) >=40 mg/dL    LDL Cholesterol Calculated 115 (H) <=100 mg/dL    Non HDL Cholesterol 162 (H) <130 mg/dL    Patient Fasting >  8hrs? Yes     Narrative    Cholesterol  Desirable:  <200 mg/dL    Triglycerides  Normal:  Less than 150 mg/dL  Borderline High:  150-199 mg/dL  High:  200-499 mg/dL  Very High:  Greater than or equal to 500 mg/dL    Direct Measure HDL  Female:  Greater than or equal to 50 mg/dL   Male:  Greater than or equal to 40 mg/dL    LDL Cholesterol  Desirable:  <100mg/dL  Above Desirable:  100-129 mg/dL   Borderline High:  130-159 mg/dL   High:  160-189 mg/dL   Very High:  >= 190 mg/dL    Non HDL Cholesterol  Desirable:  130 mg/dL  Above Desirable:  130-159 mg/dL  Borderline High:  160-189 mg/dL  High:  190-219 mg/dL  Very High:  Greater than or equal to 220 mg/dL   Basic metabolic panel  (Ca, Cl, CO2, Creat, Gluc, K, Na, BUN)   Result Value Ref Range    Sodium 139 133 - 144 mmol/L    Potassium 5.0 3.4 - 5.3 mmol/L    Chloride 106 94 - 109 mmol/L    Carbon Dioxide (CO2) 34 (H) 20 - 32 mmol/L    Anion Gap <1 (L) 3 - 14 mmol/L    Urea Nitrogen 16 7 - 30 mg/dL    Creatinine 1.19 0.66 - 1.25 mg/dL    Calcium 9.2 8.5 - 10.1 mg/dL    Glucose 109 (H) 70 - 99 mg/dL    GFR Estimate 73 >60 mL/min/1.73m2       ASSESSMENT/PLAN:   (Z00.00) Routine general medical examination at a health care facility  (primary encounter diagnosis)  Comment: Need for periodic healthcare exams and screenings.  Plan: REVIEW OF HEALTH MAINTENANCE PROTOCOL ORDERS        Advise yearly physical.    (Z12.11) Screen for colon cancer  Plan: Adult Gastro Ref - Procedure Only         (Z23) Need for DTaP vaccination  Plan: TDAP VACCINE (Adacel, Boostrix)  [[id number]]      (Z13.6) CARDIOVASCULAR SCREENING; LDL GOAL LESS THAN 160  Comment: Elevated but acceptable.  Hold on statin therapy for now.  Plan: Lipid panel reflex to direct LDL Fasting        Reviewed lifestyle.    (Z12.5) Screening for prostate cancer  Comment: Elevated, see below.  Plan: PSA, screen         (E66.3) Overweight (BMI 25.0-29.9)  Comment  Reviewed life style.  Adequate renal function.  Plan: Basic  metabolic panel  (Ca, Cl, CO2, Creat,         Gluc, K, Na, BUN)            (Z23) Need for shingles vaccine  Plan: ZOSTER VACCINE RECOMBINANT (Shingrix)      (Z23) High priority for 2019-nCoV vaccine  Plan: COVID-19,PF,MODERNA (18+ Yrs BOOSTER .25mL)    (R97.20) Elevated prostate specific antigen (PSA)  Comment: Plan addressed with phone message and [employer] result note.  Elevated PSA, will treat with antibiotics for subacute prostatitis and recheck PSA and free PSA in 1 month.  If still elevated, will refer for prostate MRI.  Plan: PSA, total and free,         sulfamethoxazole-trimethoprim (BACTRIM DS)         800-160 MG tablet      (R73.09) Abnormal glucose  Comment: Glucose 109, will refer for A1c.  Plan: Hemoglobin A1c             Patient Instructions         *  Shots today: Covid booster, Adacel, first shingles shot.   *  Blood tests today: PSA, cholesterol, kidney function.   *  Our office will contact you about a colonoscopy: If you have not heard from the scheduling office within 2 business days, please call [phone].  *   Yearly check up.           Patient has been advised of split billing requirements and indicates understanding: Yes    COUNSELING:   Reviewed preventive health counseling, as reflected in patient instructions       Regular exercise       Healthy diet/nutrition       Vision screening       Colorectal cancer screening       Prostate cancer screening    Estimated body mass index is 29.57 kg/m  as calculated from the following:    Height as of 12/4/20: 1.829 m (6').    Weight as of this encounter: 98.9 kg (218 lb).     Weight management plan: Discussed healthy diet and exercise guidelines    He reports that he has never smoked. He has never used smokeless tobacco.      Counseling Resources:  ATP IV Guidelines  Pooled Cohorts Equation Calculator  FRAX Risk Assessment  ICSI Preventive Guidelines  Dietary Guidelines for Americans, 2010  USDA's MyPlate  ASA Prophylaxis  Lung CA Screening    Kelly  Cristobal Melo MD  [facility]

## 2022-02-08 NOTE — NURSING NOTE
Prior to immunization administration, verified patients identity using patient s name and date of birth. Please see Immunization Activity for additional information.     Screening Questionnaire for Adult Immunization    Are you sick today?   No   Do you have allergies to medications, food, a vaccine component or latex?   No   Have you ever had a serious reaction after receiving a vaccination?   No   Do you have a long-term health problem with heart, lung, kidney, or metabolic disease (e.g., diabetes), asthma, a blood disorder, no spleen, complement component deficiency, a cochlear implant, or a spinal fluid leak?  Are you on long-term aspirin therapy?   No   Do you have cancer, leukemia, HIV/AIDS, or any other immune system problem?   No   Do you have a parent, brother, or sister with an immune system problem?   No   In the past 3 months, have you taken medications that affect  your immune system, such as prednisone, other steroids, or anticancer drugs; drugs for the treatment of rheumatoid arthritis, Crohn s disease, or psoriasis; or have you had radiation treatments?   No   Have you had a seizure, or a brain or other nervous system problem?   No   During the past year, have you received a transfusion of blood or blood    products, or been given immune (gamma) globulin or antiviral drug?   No   For women: Are you pregnant or is there a chance you could become       pregnant during the next month?   No   Have you received any vaccinations in the past 4 weeks?   No     Immunization questionnaire answers were all negative.        Per orders of Dr. Melo, injection of Tdap, shingrix and moderna given by Patsy Brasher MA. Patient instructed to remain in clinic for 15 minutes afterwards, and to report any adverse reaction to me immediately.       Screening performed by Patsy Brasher MA on 2/8/2022 at 8:36 AM.

## 2022-03-11 ENCOUNTER — VIRTUAL VISIT (OUTPATIENT)
Dept: FAMILY MEDICINE | Facility: CLINIC | Age: 52
End: 2022-03-11
Payer: COMMERCIAL
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
## 2022-03-11 DIAGNOSIS — S16.1XXA STRAIN OF NECK MUSCLE, INITIAL ENCOUNTER: Primary | ICD-10-CM

## 2022-03-11 PROCEDURE — 99213 OFFICE O/P EST LOW 20 MIN: CPT | Mod: 95 | Performed by: FAMILY MEDICINE
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
## 2022-03-18 DIAGNOSIS — Z11.59 ENCOUNTER FOR SCREENING FOR OTHER VIRAL DISEASES: Primary | ICD-10-CM

## 2022-04-11 ENCOUNTER — LAB (OUTPATIENT)
Dept: LAB | Facility: CLINIC | Age: 52
End: 2022-04-11
Payer: COMMERCIAL

## 2022-04-11 DIAGNOSIS — Z11.59 ENCOUNTER FOR SCREENING FOR OTHER VIRAL DISEASES: ICD-10-CM

## 2022-04-11 PROCEDURE — U0005 INFEC AGEN DETEC AMPLI PROBE: HCPCS

## 2022-04-11 PROCEDURE — U0003 INFECTIOUS AGENT DETECTION BY NUCLEIC ACID (DNA OR RNA); SEVERE ACUTE RESPIRATORY SYNDROME CORONAVIRUS 2 (SARS-COV-2) (CORONAVIRUS DISEASE [COVID-19]), AMPLIFIED PROBE TECHNIQUE, MAKING USE OF HIGH THROUGHPUT TECHNOLOGIES AS DESCRIBED BY CMS-2020-01-R: HCPCS

## 2022-04-12 ENCOUNTER — ANESTHESIA EVENT (OUTPATIENT)
Dept: GASTROENTEROLOGY | Facility: CLINIC | Age: 52
End: 2022-04-12
Payer: COMMERCIAL

## 2022-04-12 LAB — SARS-COV-2 RNA RESP QL NAA+PROBE: NEGATIVE

## 2022-04-12 NOTE — ANESTHESIA PREPROCEDURE EVALUATION
Anesthesia Pre-Procedure Evaluation    Patient: Charlie Zhang   MRN: [id number] : 1970        Procedure : Procedure(s):  COLONOSCOPY          Past Medical History:   Diagnosis Date     Hernia, abdominal       Past Surgical History:   Procedure Laterality Date     COLONOSCOPY  2007    benign colon polyps     HERNIORRHAPHY UMBILICAL N/A 7/15/2016    Procedure: HERNIORRHAPHY UMBILICAL;  Surgeon: Akil Granda MD;  Location: [facility]     LAPAROSCOPIC HERNIORRHAPHY INGUINAL BILATERAL Bilateral 7/15/2016    Procedure: LAPAROSCOPIC HERNIORRHAPHY INGUINAL BILATERAL;  Surgeon: Akil Granda MD;  Location: [facility]      Allergies   Allergen Reactions     No Known Drug Allergies       Social History     Tobacco Use     Smoking status: Never Smoker     Smokeless tobacco: Never Used   Substance Use Topics     Alcohol use: No      Wt Readings from Last 1 Encounters:   22 98.9 kg (218 lb)        Anesthesia Evaluation   Pt has had prior anesthetic. Type: General and MAC.        ROS/MED HX  ENT/Pulmonary:  - neg pulmonary ROS     Neurologic:  - neg neurologic ROS     Cardiovascular:  - neg cardiovascular ROS     METS/Exercise Tolerance:     Hematologic:  - neg hematologic  ROS     Musculoskeletal:   (+) arthritis,     GI/Hepatic:     (+) bowel prep,     Renal/Genitourinary:  - neg Renal ROS     Endo:  - neg endo ROS     Psychiatric/Substance Use:  - neg psychiatric ROS     Infectious Disease:  - neg infectious disease ROS     Malignancy:  - neg malignancy ROS     Other:  - neg other ROS          Physical Exam    Airway        Mallampati: II   TM distance: > 3 FB   Neck ROM: full   Mouth opening: > 3 cm    Respiratory Devices and Support         Dental  no notable dental history         Cardiovascular   cardiovascular exam normal          Pulmonary   pulmonary exam normal                OUTSIDE LABS:  CBC:   Lab Results   Component Value Date    WBC 5.9 2012    HGB 14.8 2012    HCT 43.7  12/03/2012     12/03/2012     BMP:   Lab Results   Component Value Date     02/08/2022     12/03/2012    POTASSIUM 5.0 02/08/2022    POTASSIUM 4.1 12/03/2012    CHLORIDE 106 02/08/2022    CHLORIDE 100 12/03/2012    CO2 34 (H) 02/08/2022    CO2 27 12/03/2012    BUN 16 02/08/2022    BUN 17 12/03/2012    CR 1.19 02/08/2022    CR 1.13 12/03/2012     (H) 02/08/2022    GLC 98 12/04/2020     COAGS: No results found for: PTT, INR, FIBR  POC: No results found for: BGM, HCG, HCGS  HEPATIC: No results found for: ALBUMIN, PROTTOTAL, ALT, AST, GGT, ALKPHOS, BILITOTAL, BILIDIRECT, LAURYN  OTHER:   Lab Results   Component Value Date    MICHELE 9.2 02/08/2022       Anesthesia Plan    ASA Status:  2      Anesthesia Type: General.   Induction: Propofol.   Maintenance: TIVA.        Consents    Anesthesia Plan(s) and associated risks, benefits, and realistic alternatives discussed. Questions answered and patient/representative(s) expressed understanding.     - Discussed: Risks, Benefits and Alternatives for BOTH SEDATION and the PROCEDURE were discussed     - Discussed with:  Patient         Postoperative Care    Pain management: IV analgesics, Oral pain medications, Multi-modal analgesia.   PONV prophylaxis: Ondansetron (or other 5HT-3), Dexamethasone or Solumedrol     Comments:                PANDA Dumont CRNA

## 2022-04-13 ENCOUNTER — ANESTHESIA (OUTPATIENT)
Dept: GASTROENTEROLOGY | Facility: CLINIC | Age: 52
End: 2022-04-13
Payer: COMMERCIAL

## 2022-04-13 ENCOUNTER — HOSPITAL ENCOUNTER (OUTPATIENT)
Facility: CLINIC | Age: 52
Discharge: HOME | End: 2022-04-13
Attending: SURGERY | Admitting: SURGERY
Payer: COMMERCIAL

## 2022-04-13 VITALS
HEART RATE: 74 BPM | WEIGHT: 220 LBS | DIASTOLIC BLOOD PRESSURE: 91 MMHG | BODY MASS INDEX: 30.8 KG/M2 | SYSTOLIC BLOOD PRESSURE: 118 MMHG | OXYGEN SATURATION: 95 % | RESPIRATION RATE: 16 BRPM | HEIGHT: 71 IN | TEMPERATURE: 98.2 F

## 2022-04-13 LAB — COLONOSCOPY: NORMAL

## 2022-04-13 PROCEDURE — 45378 DIAGNOSTIC COLONOSCOPY: CPT | Performed by: SURGERY

## 2022-04-13 PROCEDURE — G0105 COLORECTAL SCRN; HI RISK IND: HCPCS | Performed by: SURGERY

## 2022-04-13 PROCEDURE — 370N000017 HC ANESTHESIA TECHNICAL FEE, PER MIN: Performed by: SURGERY

## 2022-04-13 PROCEDURE — 250N000009 HC RX 250: Performed by: SURGERY

## 2022-04-13 PROCEDURE — 250N000011 HC RX IP 250 OP 636: Performed by: NURSE ANESTHETIST, CERTIFIED REGISTERED

## 2022-04-13 PROCEDURE — 258N000003 HC RX IP 258 OP 636: Performed by: SURGERY

## 2022-04-13 PROCEDURE — 250N000009 HC RX 250: Performed by: NURSE ANESTHETIST, CERTIFIED REGISTERED

## 2022-04-13 RX ORDER — FLUMAZENIL 0.1 MG/ML
0.2 INJECTION, SOLUTION INTRAVENOUS
Status: DISCONTINUED | OUTPATIENT
Start: 2022-04-13 | End: 2022-04-13 | Stop reason: HOSPADM

## 2022-04-13 RX ORDER — NALOXONE HYDROCHLORIDE 0.4 MG/ML
0.4 INJECTION, SOLUTION INTRAMUSCULAR; INTRAVENOUS; SUBCUTANEOUS
Status: DISCONTINUED | OUTPATIENT
Start: 2022-04-13 | End: 2022-04-13 | Stop reason: HOSPADM

## 2022-04-13 RX ORDER — ONDANSETRON 2 MG/ML
4 INJECTION INTRAMUSCULAR; INTRAVENOUS
Status: DISCONTINUED | OUTPATIENT
Start: 2022-04-13 | End: 2022-04-13 | Stop reason: HOSPADM

## 2022-04-13 RX ORDER — SODIUM CHLORIDE, SODIUM LACTATE, POTASSIUM CHLORIDE, CALCIUM CHLORIDE 600; 310; 30; 20 MG/100ML; MG/100ML; MG/100ML; MG/100ML
INJECTION, SOLUTION INTRAVENOUS CONTINUOUS
Status: DISCONTINUED | OUTPATIENT
Start: 2022-04-13 | End: 2022-04-13 | Stop reason: HOSPADM

## 2022-04-13 RX ORDER — NALOXONE HYDROCHLORIDE 0.4 MG/ML
0.2 INJECTION, SOLUTION INTRAMUSCULAR; INTRAVENOUS; SUBCUTANEOUS
Status: DISCONTINUED | OUTPATIENT
Start: 2022-04-13 | End: 2022-04-13 | Stop reason: HOSPADM

## 2022-04-13 RX ORDER — LIDOCAINE HYDROCHLORIDE 10 MG/ML
INJECTION, SOLUTION EPIDURAL; INFILTRATION; INTRACAUDAL; PERINEURAL PRN
Status: DISCONTINUED | OUTPATIENT
Start: 2022-04-13 | End: 2022-04-13

## 2022-04-13 RX ORDER — PROPOFOL 10 MG/ML
INJECTION, EMULSION INTRAVENOUS PRN
Status: DISCONTINUED | OUTPATIENT
Start: 2022-04-13 | End: 2022-04-13

## 2022-04-13 RX ORDER — PROPOFOL 10 MG/ML
INJECTION, EMULSION INTRAVENOUS CONTINUOUS PRN
Status: DISCONTINUED | OUTPATIENT
Start: 2022-04-13 | End: 2022-04-13

## 2022-04-13 RX ORDER — LIDOCAINE 40 MG/G
CREAM TOPICAL
Status: DISCONTINUED | OUTPATIENT
Start: 2022-04-13 | End: 2022-04-13 | Stop reason: HOSPADM

## 2022-04-13 RX ADMIN — LIDOCAINE HYDROCHLORIDE 50 MG: 10 INJECTION, SOLUTION EPIDURAL; INFILTRATION; INTRACAUDAL; PERINEURAL at 10:03

## 2022-04-13 RX ADMIN — LIDOCAINE HYDROCHLORIDE 0.1 ML: 10 INJECTION, SOLUTION EPIDURAL; INFILTRATION; INTRACAUDAL; PERINEURAL at 09:16

## 2022-04-13 RX ADMIN — PROPOFOL 100 MG: 10 INJECTION, EMULSION INTRAVENOUS at 10:03

## 2022-04-13 RX ADMIN — PROPOFOL 200 MCG/KG/MIN: 10 INJECTION, EMULSION INTRAVENOUS at 10:03

## 2022-04-13 RX ADMIN — SODIUM CHLORIDE, POTASSIUM CHLORIDE, SODIUM LACTATE AND CALCIUM CHLORIDE: 600; 310; 30; 20 INJECTION, SOLUTION INTRAVENOUS at 09:16

## 2022-04-13 NOTE — H&P
"51 year old year old male here for colonoscopy for personal history of polyps and family history.  Last colonoscopy was 5 years ago and negative.  Patient denies any changes in stool caliber or blood in stool. There is a family history of colon cancer in his father in his 50's.    Patient Active Problem List   Diagnosis      seborrheic keratosis       Past Medical History:   Diagnosis Date     Hernia, abdominal        Past Surgical History:   Procedure Laterality Date     COLONOSCOPY  2007    benign colon polyps     HERNIORRHAPHY UMBILICAL N/A 7/15/2016    Procedure: HERNIORRHAPHY UMBILICAL;  Surgeon: Akil Granda MD;  Location: [facility]     LAPAROSCOPIC HERNIORRHAPHY INGUINAL BILATERAL Bilateral 7/15/2016    Procedure: LAPAROSCOPIC HERNIORRHAPHY INGUINAL BILATERAL;  Surgeon: Akil Granda MD;  Location: [facility]       Family History   Problem Relation Age of Onset     Cancer - colorectal Father      Prostate Cancer Father        No current outpatient medications on file.       Allergies   Allergen Reactions     No Known Drug Allergies        Pt reports that he has never smoked. He has never used smokeless tobacco. He reports that he does not drink alcohol and does not use drugs.    Exam:  BP (!) 139/99   Temp 98.3  F (36.8  C) (Oral)   Resp 18   Ht 1.803 m (5' 11\")   Wt 99.8 kg (220 lb)   SpO2 96%   BMI 30.68 kg/m      Awake, Alert OX3  Lungs - CTA bilaterally  CV - RRR, no murmurs, distal pulses intact  Abd - soft, non-distended, non-tender, +BS  Extr - No cyanosis or edema    A/P 51 year old year old male in need of colonoscopy for personal history of polyps and family history. Risks, benefits, alternatives, and complications were discussed including the possibility of perforation, bleeding, missed lesion and the patient agreed to proceed.    Guille Belcher, DO on 4/13/2022 at 9:30 AM      "

## 2022-04-13 NOTE — ANESTHESIA CARE TRANSFER NOTE
Patient: Charlie Zhang    Procedure: Procedure(s):  COLONOSCOPY       Diagnosis: Screen for colon cancer [Z12.11]  Diagnosis Additional Information: No value filed.    Anesthesia Type:   General     Note:    Oropharynx: oropharynx clear of all foreign objects  Level of Consciousness: drowsy  Oxygen Supplementation: nasal cannula    Independent Airway: airway patency satisfactory and stable  Dentition: dentition unchanged  Vital Signs Stable: post-procedure vital signs reviewed and stable  Report to RN Given: handoff report given  Patient transferred to: Phase II    Handoff Report: Identifed the Patient, Identified the Reponsible Provider, Reviewed the pertinent medical history, Discussed the surgical course, Reviewed Intra-OP anesthesia mangement and issues during anesthesia, Set expectations for post-procedure period and Allowed opportunity for questions and acknowledgement of understanding      Vitals:  Vitals Value Taken Time   BP     Temp     Pulse     Resp     SpO2         Electronically Signed By: PANDA Ferguson CRNA  April 13, 2022  10:28 AM

## 2022-04-13 NOTE — ANESTHESIA POSTPROCEDURE EVALUATION
Patient: Charlie Zhang    Procedure: Procedure(s):  COLONOSCOPY       Anesthesia Type:  General    Note:  Disposition: Outpatient   Postop Pain Control: Uneventful            Sign Out: Well controlled pain   PONV: No   Neuro/Psych: Uneventful            Sign Out: Acceptable/Baseline neuro status   Airway/Respiratory: Uneventful            Sign Out: Acceptable/Baseline resp. status   CV/Hemodynamics: Uneventful            Sign Out: Acceptable CV status; No obvious hypovolemia; No obvious fluid overload   Other NRE: NONE   DID A NON-ROUTINE EVENT OCCUR? No           Last vitals:  Vitals Value Taken Time   BP 98/74 04/13/22 1030   Temp 36.8  C (98.2  F) 04/13/22 1028   Pulse 83 04/13/22 1030   Resp 16 04/13/22 1028   SpO2 96 % 04/13/22 1031   Vitals shown include unvalidated device data.    Electronically Signed By: PANDA Ferguson CRNA  April 13, 2022  10:32 AM

## 2022-10-18 ASSESSMENT — SLEEP AND FATIGUE QUESTIONNAIRES
HOW LIKELY ARE YOU TO NOD OFF OR FALL ASLEEP WHILE SITTING AND READING: SLIGHT CHANCE OF DOZING
HOW LIKELY ARE YOU TO NOD OFF OR FALL ASLEEP WHILE WATCHING TV: SLIGHT CHANCE OF DOZING
HOW LIKELY ARE YOU TO NOD OFF OR FALL ASLEEP WHILE SITTING QUIETLY AFTER LUNCH WITHOUT ALCOHOL: WOULD NEVER DOZE
HOW LIKELY ARE YOU TO NOD OFF OR FALL ASLEEP WHEN YOU ARE A PASSENGER IN A CAR FOR AN HOUR WITHOUT A BREAK: SLIGHT CHANCE OF DOZING
HOW LIKELY ARE YOU TO NOD OFF OR FALL ASLEEP WHILE SITTING INACTIVE IN A PUBLIC PLACE: WOULD NEVER DOZE
HOW LIKELY ARE YOU TO NOD OFF OR FALL ASLEEP WHILE LYING DOWN TO REST IN THE AFTERNOON WHEN CIRCUMSTANCES PERMIT: SLIGHT CHANCE OF DOZING
HOW LIKELY ARE YOU TO NOD OFF OR FALL ASLEEP WHILE SITTING AND TALKING TO SOMEONE: WOULD NEVER DOZE
HOW LIKELY ARE YOU TO NOD OFF OR FALL ASLEEP IN A CAR, WHILE STOPPED FOR A FEW MINUTES IN TRAFFIC: WOULD NEVER DOZE

## 2022-10-21 ENCOUNTER — OFFICE VISIT (OUTPATIENT)
Dept: SLEEP MEDICINE | Facility: CLINIC | Age: 52
End: 2022-10-21
Payer: COMMERCIAL

## 2022-10-21 VITALS
WEIGHT: 219 LBS | BODY MASS INDEX: 30.66 KG/M2 | OXYGEN SATURATION: 97 % | HEIGHT: 71 IN | DIASTOLIC BLOOD PRESSURE: 84 MMHG | HEART RATE: 71 BPM | SYSTOLIC BLOOD PRESSURE: 128 MMHG

## 2022-10-21 DIAGNOSIS — R06.83 SNORING: ICD-10-CM

## 2022-10-21 DIAGNOSIS — G47.33 OBSTRUCTIVE SLEEP APNEA: Primary | ICD-10-CM

## 2022-10-21 PROCEDURE — 99205 OFFICE O/P NEW HI 60 MIN: CPT | Performed by: STUDENT IN AN ORGANIZED HEALTH CARE EDUCATION/TRAINING PROGRAM

## 2022-10-21 NOTE — PROGRESS NOTES
[facility]  Consultation Note    Name: Charlie Zhang MRN#: [id number]   Age: 52 year old YOB: 1970     Date of Consultation: 10/21/22  Consultation is requested by: Kelly Melo  Primary care provider: Kelly Melo MD       Reason for Sleep Consult:   Charlie Zhang is sent by Kelly Melo for a sleep consultation regarding evaluation for possible sleep apnea     Charlie Zhang main reason for visit: My wife says i stop breathing at times when i snore  Patient states problem(s) started: 1 year ago  Charlie Zhang's goals for this visit: Just to see if i have any sleep concerns           History of Present Illness:   Charlie Zhang has no sigificant past medical history. He has not had a previous sleep study.    SLEEP-WAKE SCHEDULE:     Work/School Days: Patient goes to school/work: Yes   Usually gets into bed at 10:00 or 10:30  Takes patient about Not long at all. Usually fall asleep quickly to fall asleep  Has trouble falling asleep None nights per week  Wakes up in the middle of the night 1 times.  Wakes up due to Use the bathroom; tries to limit water intake after 9 pm  He has trouble falling back asleep 3-4 times a week times a week.   It usually takes Most time  it takes 30 - 45 minutes to get back to sleep  Patient is usually up at 5:30  Uses alarm: Yes  Un-refreshing Sleep: No  Sleep Inertia: No    Weekends/Non-work Days/All Other Days:  Usually gets into bed at 10:30-11:00   Takes patient about Fall asleep quickly to fall asleep  Patient is usually up at 8 am  Uses alarm: No    Sleep Need  Patient gets  6-7 hours sleep on average   Patient thinks she needs about 6-8 hours sleep    Charlie Zhang prefers to sleep in this position(s): Side   Patient states they do the following activities in bed: Watch TV    Naps  Patient takes a purposeful nap Never times a week and naps are usually Don t take naps in duration  He feels better after  a nap: Yes  He dozes off unintentionally 3 times a week days per week  Patient has had a driving accident or near-miss due to sleepiness/drowsiness: No      SLEEP DISRUPTIONS:    Breathing/Snoring  Patient snores:Yes  Other people complain about her snoring: Yes  Patient has been told she stops breathing in her sleep:Yes  He has issues with the following: Getting up to urinate more than once  Snort/Gasping Arousal: No    Movement:  Patient gets pain, discomfort, with an urge to move:  No  It happens when she is resting:  No  It happens more at night:  No  Patient has been told she kicks her legs at night:  No     Behaviors in Sleep:  Charlie Zhang has experienced the following behaviors while sleeping:  (N/A)  She has experienced sudden muscle weakness during the day: No       Is there anything else you would like your sleep provider to know:    Vivid Dreams: No  Dream enactment: No  Confusional arousals: No      CAFFEINE AND OTHER SUBSTANCES:    Patient consumes caffeinated beverages per day:  1-2 cans of soda  Last caffeine use is usually: On weekdays aroud 2 pm  List of any prescribed or over the counter stimulants that patient takes: N/A  List of any prescribed or over the counter sleep medication patient takes: N/A  List of previous sleep medications that patient has tried: N/A  Patient drinks alcohol to help them sleep: No  Patient drinks alcohol near bedtime: No    Family History:  Patient has a family member been diagnosed with a sleep disorder: Yes  Brothers both have sleep disorders. Both with KAI on CPAP          SCALES:    EPWORTH SLEEPINESS SCALE      [place] Sleepiness Scale ( DAYDAY [place]  1990-1997[facility] [place]/English - Final version - 21 Nov 07 - [facility] [employer].) 10/18/2022   Sitting and reading Slight chance of dozing   Watching TV Slight chance of dozing   Sitting, inactive in a public place (e.g. a theatre or a meeting) Would never doze   As a passenger in a car for an hour without a  break Slight chance of dozing   Lying down to rest in the afternoon when circumstances permit Slight chance of dozing   Sitting and talking to someone Would never doze   Sitting quietly after a lunch without alcohol Would never doze   In a car, while stopped for a few minutes in traffic Would never doze   [place] Score (MC) 4   [place] Score (Sleep) 4         INSOMNIA SEVERITY INDEX (AKOSUA)      Insomnia Severity Index (AKOSUA) 10/18/2022   Difficulty falling asleep 1   Difficulty staying asleep 1   Problems waking up too early 2   How SATISFIED/DISSATISFIED are you with your CURRENT sleep pattern? 2   How NOTICEABLE to others do you think your sleep problem is in terms of impairing the quality of your life? 2   How WORRIED/DISTRESSED are you about your current sleep problem? 1   To what extent do you consider your sleep problem to INTERFERE with your daily functioning (e.g. daytime fatigue, mood, ability to function at work/daily chores, concentration, memory, mood, etc.) CURRENTLY? 0   AKOSUA Total Score 9       Guidelines for Scoring/Interpretation:  Total score categories:  0-7 = No clinically significant insomnia   8-14 = Subthreshold insomnia   15-21 = Clinical insomnia (moderate severity)  22-28 = Clinical insomnia (severe)  Used via courtesy of www.[employer].va.gov with permission from Gage Ballesteros PhD., [facility]      STOP BANG     STOP BANG Questionnaire (  2008, the American Society of Anesthesiologists, Inc. [employer] 10/21/2022   1. Snoring - Do you snore loudly (louder than talking or loud enough to be heard through closed doors)? Yes   2. Tired - Do you often feel tired, fatigued, or sleepy during daytime? No   3. Observed - Has anyone observed you stop breathing during your sleep? Yes   4. Blood pressure - Do you have or are you being treated for high blood pressure? No   5. BMI - BMI more than 35 kg/m2? No   6. Age - Age over 50 yr old? Yes   7. Neck circumference - Neck  "circumference greater than 40 cm? Yes   8. Gender - Gender male? Yes   STOP BANG Score (MC): 5   STOP BANG Score (Sleep) 5   Neck Cir (cm) Clinic: 47   B/P Clinic: 128/84   BMI Clinic: 30.54         GAD7     No flowsheet data found.      CAGE-AID    No flowsheet data found.    CAGE-AID reprinted with permission from the [place] Medical Journal, DONG Pascual. and JOSE LUIS Vela, \"Conjoint screening questionnaires for alcohol and drug abuse\" [place] Medical Journal 94: 135-140, 1995.      PATIENT HEALTH QUESTIONNAIRE-9 (PHQ - 9)    No flowsheet data found.    Developed by Mark Escamilla, Sheree Fischer, Antonio Anton and colleagues, with an educational cameron from [employer] No permission required to reproduce, translate, display or distribute.        Allergies:    Allergies   Allergen Reactions     No Known Drug Allergies        Medications:    No current outpatient medications on file.       Problem List:  Patient Active Problem List    Diagnosis Date Noted     Obstructive sleep apnea 10/21/2022     Priority: Medium      seborrheic keratosis 07/31/2012     Priority: Medium        Past Medical/Surgical History:  Past Medical History:   Diagnosis Date     Hernia, abdominal      Obstructive sleep apnea 10/21/2022     Past Surgical History:   Procedure Laterality Date     COLONOSCOPY  2007    benign colon polyps     COLONOSCOPY N/A 4/13/2022    Procedure: COLONOSCOPY;  Surgeon: Guille Belcher DO;  Location: [facility]     HERNIORRHAPHY UMBILICAL N/A 7/15/2016    Procedure: HERNIORRHAPHY UMBILICAL;  Surgeon: Akil Granda MD;  Location: [facility]     LAPAROSCOPIC HERNIORRHAPHY INGUINAL BILATERAL Bilateral 7/15/2016    Procedure: LAPAROSCOPIC HERNIORRHAPHY INGUINAL BILATERAL;  Surgeon: Akil Granda MD;  Location: [facility]       Social History:  Social History     Socioeconomic History     Marital status:      Spouse name: Agueda     Number of children: 2     Years of education: Not on " "file     Highest education level: Not on file   Occupational History     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 Not Asked   Social History Narrative     Not on file     Social Determinants of Health     Financial Resource Strain: Not on file   Food Insecurity: Not on file   Transportation Needs: Not on file   Physical Activity: Not on file   Stress: Not on file   Social Connections: Not on file   Intimate Partner Violence: Not on file   Housing Stability: Not on file       Family History:  Family History   Problem Relation Age of Onset     Cancer - colorectal Father      Prostate Cancer Father      Sleep Apnea Brother      Sleep Apnea Brother        Review of Systems:   A complete review of systems reviewed by me is negative with the exeption of what has been mentioned in the history of present illness.  In the last TWO WEEKS have you experienced any of the following symptoms?  Fevers: No  Night Sweats: No  Weight Gain: No  Pain at Night: No  Double Vision: No  Changes in Vision: No  Difficulty Breathing through Nose: No  Sore Throat in Morning: No  Dry Mouth in the Morning: No  Shortness of Breath Lying Flat: No  Shortness of Breath With Activity: No  Awakening with Shortness of Breath: No  Increased Cough: No  Heart Racing at Night: No  Swelling in Feet or Legs: No  Diarrhea at Night: No  Heartburn at Night: No  Urinating More than Once at Night: No  Losing Control of Urine at Night: No  Joint Pains at Night: No  Headaches in Morning: No  Weakness in Arms or Legs: No  Depressed Mood: No  Anxiety: No     Physical Exam:   Vitals: /84   Pulse 71   Ht 1.803 m (5' 11\")   Wt 99.3 kg (219 lb)   SpO2 97%   BMI 30.54 kg/m    BMI= " Body mass index is 30.54 kg/m .  Mandibular profile: no retrognathia   Mallampati Class: II.  Tonsillar Stage: 1  hidden by pillars.  GENERAL: Healthy, alert and no distress  EYES: Eyes grossly normal to inspection.  No discharge or erythema, or obvious scleral/conjunctival abnormalities.  RESP: No audible wheeze, cough, or visible cyanosis.  No visible retractions or increased work of breathing.    SKIN: Visible skin clear. No significant rash, abnormal pigmentation or lesions.  NEURO: Cranial nerves grossly intact.  Mentation and speech appropriate for age.  PSYCH: Mentation appears normal, affect normal/bright, judgement and insight intact, normal speech and appearance well-groomed.    Neck Cir (cm): 47 cm       Data: All pertinent previous laboratory data reviewed     Recent Labs   Lab Test 02/08/22  0821 12/04/20  0000     --    POTASSIUM 5.0  --    CHLORIDE 106  --    CO2 34*  --    ANIONGAP <1*  --    * 98   BUN 16  --    CR 1.19  --    MICHELE 9.2  --        No results for input(s): WBC, RBC, HGB, HCT, MCV, MCH, MCHC, RDW, PLT in the last 00165 hours.    No results for input(s): PROTTOTAL, ALBUMIN, BILITOTAL, ALKPHOS, AST, ALT, BILIDIRECT in the last 71247 hours.    No results found for: TSH    No results found for: UAMP, UBARB, BENZODIAZEUR, UCANN, UCOC, OPIT, UPCP    No results found for: IRONSAT, SP21133, RICCO    No results found for: PH, PHARTERIAL, PO2, [id number], SAT, PCO2, HCO3, BASEEXCESS, NELIDA, BEB    Echocardiology: No results found for this or any previous visit (from the past 4320 hour(s)).    Chest x-ray: No results found for this or any previous visit from the past 365 days.    Chest CT: No results found for this or any previous visit from the past 365 days.    PFT: No results found for this or any previous visit from the past 365 days.      Assessment and Plan:     Problem List Items Addressed This Visit        Respiratory    Obstructive sleep apnea - Primary     STOP BANG score is  5/8. Patient likely has sleep apnea based on clinical history of snoring, nocturia, witnessed apneas, age, neck circumference, and gender.  Patient denies any daytime dysfunction.  Obstructive sleep apnea reviewed. Complications of Untreated Sleep Apnea Reviewed.  HST/ Polysomnography reviewed. Information provided regarding treatment options for KAI.    Recommend HST due to his high pretest probability of having KAI as noted above    Recommend continuing to elevate head of bed to decrease snoring and witnessed apneas           Relevant Orders    HST - Home Sleep Apnea Test  - WatchPat NonReturnable   Other Visit Diagnoses     Snoring        Relevant Orders    HST - Home Sleep Apnea Test  - WatchPat NonReturnable          The above note was dictated using voice recognition software. Although reviewed after completion, some word and grammatical error may remain . Please contact the author for any clarifications.    Total time spent reviewing medical records, history and physical examination, review of previous testing and interpretation as well as documentation on this date: 65 minutes    Serg Chen MD on 10/21/2022   [facility]   Floor 1, [address]   [address] [place], [place] [address]. [place] [address]   Appointments: [phone]    CC: Kelly Melo

## 2022-10-21 NOTE — PATIENT INSTRUCTIONS
"MY INFORMATION ON SLEEP APNEA-  Charlie Zhang    DOCTOR : Serg Chen MD  SLEEP CENTER :      MY CONTACT NUMBER:     [facility]  [phone]    A sleep study will be scheduled to rule out sleep apnea. You will have a home sleep study via WatchPAT One device  The sleep lab will call you to set up delivery of this device   Results will be discussed over the phone or MyChart   Follow up based on sleep study results    Key Points:  1. What is Obstructive Sleep Apnea (KAI)? KAI is the most common type of sleep apnea. Apnea literally means, \"without breath.\" It is characterized by repetitive pauses in breathing, despite continued effort to breathe, and is usually associated with a reduction in blood oxygen saturation. Apneas can last 10 to over 60 seconds. It is caused by narrowing or collapse of the upper airway as muscles relax during sleep.   2. What are the consequences of KAI? Symptoms include: daytime sleepiness- possibly increasing the risk of falling asleep while driving, unrefreshing/restless sleep, snoring, insomnia, waking frequently to urinate, waking with heartburn or reflux, reduced concentration and memory, and morning headaches. Other health consequences may include development of high blood pressure. Untreated KAI also can contribute to heart disease, stroke and diabetes.   3. What are the treatment options? In most situations, sleep apnea is a lifelong disease that must be managed with daily therapy. Continuous Positive Airway (CPAP) is the most reliable treatment. A mouthguard to hold your jaw forward is usually the next most reliable option. Other options include postioning devices (to keep you off your back), nasal valves, tongue retaining device, weight loss, surgery. There is more detail about these options toward the end of this document.  4. What are the most important things to remember about using CPAP?     WHERE CAN I FIND MORE INFORMATION?    American " Academy of Sleep Medicine Patient information on sleep disorders:  http://yoursleep.aasmnet.org    CPAP -  WHY AND HOW?                 Continuous positive airway pressure, or CPAP, is the most effective treatment for obstructive sleep apnea. It works by blowing room air, through a mask, to hold your throat open. A decision to use CPAP is a major step forward in the pursuit of a healthier life. The successful use of CPAP will help you breathe easier, sleep better and live healthier. Using CPAP can be a positive experience if you keep these mcmahan points in mind:  Commitment  CPAP is not a quick fix for your problem. It involves a long-term commitment to improve your sleep and your health.    Communication  Stay in close communication with both your sleep doctor and your CPAP supplier. Ask lots of questions and seek help when you need it.    Consistency  Use CPAP all night, every night and for every nap. You will receive the maximum health benefits from CPAP when you use it every time that you sleep. This will also make it easier for your body to adjust to the treatment.    Correction  The first machine and mask that you try may not be the best ones for you. Work with your sleep doctor and your CPAP supplier to make corrections to your equipment selection. Ask about trying a different type of machine or mask if you have ongoing problems. Make sure that your mask is a good fit and learn to use your equipment properly.    Challenge  Tell a family member or close friend to ask you each morning if you used your CPAP the previous night. Have someone to challenge you to give it your best effort.    Connection   Your adjustment to CPAP will be easier if you are able to connect with others who use the same treatment. Ask your sleep doctor if there is a support group in your area for people who have sleep apnea, or look for one on the Internet.  Comfort   Increase your level of comfort by using a saline spray, decongestant or  "heated humidifier if CPAP irritates your nose, mouth or throat. Use your unit's \"ramp\" setting to slowly get used to the air pressure level. There may be soft pads you can buy that will fit over your mask straps. Look on www.CPAP.com for accessories that can help make CPAP use more comfortable.  Cleaning   Clean your mask, tubing and headgear on a regular basis. Put this time in your schedule so that you don't forget to do it. Check and replace the filters for your CPAP unit and humidifier.    Completion   Although you are never finished with CPAP therapy, you should reward yourself by celebrating the completion of your first month of treatment. Expect this first month to be your hardest period of adjustment. It will involve some trial and error as you find the machine, mask and pressure settings that are right for you.    Continuation  After your first month of treatment, continue to make a daily commitment to use your CPAP all night, every night and for every nap.    CPAP-Tips to starting with success:  Begin using your CPAP for short periods of time during the day while you watch TV or read.    Use CPAP every night and for every nap. Using it less often reduces the health benefits and makes it harder for your body to get used to it.    Newer CPAP models are virtually silent; however, if you find the sound of your CPAP machine to be bothersome, place the unit under your bed to dampen the sound.     Make small adjustments to your mask, tubing, straps and headgear until you get the right fit. Tightening the mask may actually worsen the leak.  If it leaves significant marks on your face or irritates the bridge of your nose, it may not be the best mask for you.  Speak with the person who supplied the mask and consider trying other masks. Insurances will allow you to try different masks during the first month of starting CPAP.  Insurance also covers a new mask, hose and filter about every 6 months.    Use a saline " nasal spray to ease mild nasal congestion. Neti-Pot or saline nasal rinses may also help. Nasal gel sprays can help reduce nasal dryness.  Biotene mouthwash can be helpful to protect your teeth if you experience frequent dry mouth.  Dry mouth may be a sign of air escaping out of your mouth or out of the mask in the case of a full face mask.  Speak with your provider if you expect that is the case.     Take a nasal decongestant to relieve more severe nasal or sinus congestion.  Do not use Afrin (oxymetazoline) nasal spray more than 3 days in a row.  Speak with your sleep doctor if your nasal congestion is chronic.    Use a heated humidifier that fits your CPAP model to enhance your breathing comfort. Adjust the heat setting up if you get a dry nose or throat, down if you get condensation in the hose or mask.  Position the CPAP lower than you so that any condensation in the hose drains back into the machine rather than towards the mask.    Try a system that uses nasal pillows if traditional masks give you problems.    Clean your mask, tubing and headgear once a week. Make sure the equipment dries fully.    Regularly check and replace the filters for your CPAP unit and humidifier.    Work closely with your sleep provider and your CPAP supplier to make sure that you have the machine, mask and air pressure setting that works best for you. It is better to stop using it and call your provider to solve problems than to lay awake all night frustrated with the device.    BESIDES CPAP, WHAT OTHER THERAPIES ARE THERE?  Postioning devices if you only have the snoring or apnea while on your back  Dental devices if your condition is mild  Nasal valves may be effective though experience is limited  Weight loss if you are overweight  Surgery in limited cases where devices are not acceptable or there are problems with structures in the nose and throat  If treated with one of these alternative options, further evaluation is necessary  to ensure that the therapy is effective. This may require some form of repeat testing.      Healthy Lifestyle:  Healthy diet, exercise and limit alcohol: Not only will excessive alcohol increase your weight over time, but it irritates the throat tissues and make them swell, shrinking the airway and causing snoring. Drinking alcohol should be limited and stopped within 3-4 hours before going to bed.   Stop smoking: (Red swollen throat, heat, nicotine), also irritates and swells the airway, among numerous other negative health consequences.    Positioning Device  This example shows a pillow that straps around the waist. It may be appropriate for those whose sleep study shows milder sleep apnea that occurs primarily when lying flat on one's back. Preliminary studies have shown benefit but effectiveness at home should be verified.                      Oral Appliance  These are examples of two of many custom-made devices that are more likely to work in mild sleep apnea   Oral appliances are dental mouth pieces that fit very much like a sports mouth guards or removable orthodontic retainers. They are used to treat snoring and obstructive sleep apnea . The device prevents the airway from collapsing by either supporting the jaw in a forward position. Since oral appliances are non-invasive and easy to use, they may be considered as an early treatment option. Oral appliance therapy (OAT) involves the customization, selection, fabrication, fitting, adjustments and long-term follow-up care.  Custom made oral appliances are proven to be more effective than over-the-counter devices. Therefore, the over-the-counter devices are recommended not to be used as a screening tool nor as a therapeutic option.     Who gets a dental device?  Oral appliance therapy can be used as an alternative to CPAP therapy for the treatment of mild to moderate sleep apnea and for those patients who prefer OAT to CPAP. Oral appliance therapy is a first  line therapy for the treatment of primary snoring. Additionally, OAT is an option for those that cannot tolerate CPAP as therapy or who have experienced insufficient surgical results.                 Possible side effects?  Frequent but minor side effects include: excessive salivation, dry mouth, discomfort of teeth and jaw and temporary changes in the patient s bite.  Potential complications include: jaw pain, permanent occlusal changes and TMJ symptoms.  The above mentioned side effects and complications can be recognized and managed by dentists trained in dental sleep medicine.   Finding a dentist that practices dental sleep medicine  Specific training is available through the American Academy of Dental Sleep Medicine for dentists interested in working in the field of sleep. To find a dentist who is educated in the field of sleep and the use of oral appliances, near you, visit the Web site of the American Academy of Dental Sleep Medicine; also see http://www.accpstorage.org/newOrganization/patients/oralAppliances.pdf   To search for a dentist certified in these practices:  Http://aadsm.org/FindADentist.aspx?1  Nasal Valves              Nasal valves may be an option for mild apnea if other options are not well tolerated. The efficacy of these devices is generally less than CPAP or oral appliances.They may not be effective if you have frequent nasal congestion or have difficulty breathing through your nose.   Weight Loss:    Weight loss decreases severity of sleep apnea in most people with obesity. For those with mild obesity who have developed snoring with weight gain, even 15-30 pound weight loss can improve and occasionally eliminate sleep apnea.  Structured and life-long dietary and health habits are necessary to lose weight and keep healthier weight levels.     Though there are significant health benefits from weight loss, long-term weight loss is very difficult to achieve- studies show success with dietary  management in less than 10% of people. In addition, substantial weight loss may require years of dietary control and may be difficult if patients have severe obesity. In these cases, surgical management may be considered.    If you are interested in methods for weight loss, you should review the options discussed at the National Institutes of Health patient information sites:     http://win.niddk.nih.gov/publications/index.htm  http:/www.health.nih.gov/topic/WeightLossDieting    Bariatric programs offer counseling in all methods of weight loss:    Http:/www.[email or web address].[email or web address]/Specialties/WeightLossSurgeryandMedicalMgmt/htm    Your BMI is Body mass index is 30.54 kg/m .        Body mass index (BMI) is one way to tell whether you are at a healthy weight, overweight, or obese. It measures your weight in relation to your height.  A BMI of 18.5 to 24.9 is in the healthy range. A person with a BMI of 25 to 29.9 is considered overweight, and someone with a BMI of 30 or greater is considered obese.  Another way to find out if you are at risk for health problems caused by overweight and obesity is to measure your waist. If you are a woman and your waist is more than 35 inches, or if you are a man and your waist is more than 40 inches, your risk of disease may be higher.  More than two-thirds of American adults are considered overweight or obese. Being overweight or obese increases the risk for further weight gain.  Excess weight may lead to heart disease and diabetes. Creating and following plans for healthy eating and physical activity may help you improve your health.    Methods for maintaining or losing weight.    Weight control is part of healthy lifestyle and includes exercise, emotional health, and healthy eating habits.  Careful eating habits lifelong is the mainstay of weight control.  Though there are significant health benefits from weight loss, long-term weight loss with diet alone may be very difficult to  "achieve- studies show long-term success with dietary management in less than 10% of people. Attaining a healthy weight may be especially difficult to achieve in those with severe obesity. In some cases, medications, devices and surgical management might be considered.    What can you do?    If you are overweight or obese and are interested in methods for weight loss, you should discuss this with your provider. In addition, we recommend that you review healthy life styles and methods for weight loss available through the National Institutes of Health patient information sites:     http://win.niddk.nih.gov/publications/index.htm      Surgery:  There are a number of surgeries that have been attempted to treat apnea. In general, surgical options are usually reserved for cases in which there is a physical abnormality contributing to obstruction or other treatment options are ineffective or not tolerated. Most surgical options are either unreliable or quite invasive. One of the more common procedures is:  Uvulopalatopharyngoplasty: In this procedure, the uvula (the finger-like tissue that hangs in the back of the throat), part of the soft palate (the tissue that the uvula is attached to), and sometimes the tonsils or adenoids are removed. The efficacy of this surgery is around 30-50% .  After surgery, complications may include:  Sleepiness and sleep apnea related to post-surgery medication   Swelling, infection and bleeding   A sore throat and/or difficulty swallowing   Drainage of secretions into the nose and a nasal quality to the voice. English language speech does not seem to be affected by this surgery.   Narrowing of the airway in the nose and throat (hence constricting breathing) snoring and even iatrogenically caused sleep apnea. By cutting the tissues, excess scar tissue can \"tighten\" the airway and make it even smaller than it was before UPPP.  Patients who have had the uvula removed will become unable to " correctly speak Citizen of [place] or any other language that has a uvular 'r' phoneme.    Surgeries to help resolve nasal congestion may help reduce the severity of apnea slightly. Nasal congestion does not cause apnea on its own, so these surgeries are usually not performed just for KAI.  They may be worth considering if the nasal congestion is significantly bothersome independent of apnea.

## 2022-10-21 NOTE — ASSESSMENT & PLAN NOTE
STOP BANG score is 5/8. Patient likely has sleep apnea based on clinical history of snoring, nocturia, witnessed apneas, age, neck circumference, and gender.  Patient denies any daytime dysfunction.  Obstructive sleep apnea reviewed. Complications of Untreated Sleep Apnea Reviewed.  HST/ Polysomnography reviewed. Information provided regarding treatment options for KAI.    Recommend HST due to his high pretest probability of having KAI as noted above    Recommend continuing to elevate head of bed to decrease snoring and witnessed apneas

## 2022-10-29 ENCOUNTER — HEALTH MAINTENANCE LETTER (OUTPATIENT)
Age: 52
End: 2022-10-29

## 2022-11-04 ENCOUNTER — VIRTUAL VISIT (OUTPATIENT)
Dept: SLEEP MEDICINE | Facility: CLINIC | Age: 52
End: 2022-11-04
Payer: COMMERCIAL

## 2022-11-04 DIAGNOSIS — R06.83 SNORING: ICD-10-CM

## 2022-11-04 DIAGNOSIS — G47.33 OBSTRUCTIVE SLEEP APNEA: ICD-10-CM

## 2022-11-04 PROCEDURE — 95800 SLP STDY UNATTENDED: CPT

## 2022-11-09 NOTE — PROGRESS NOTES
Watch Pat has been scored using rule 1B, 4%.  Patient to follow up with provider to determine appropriate therapy.    PAT AHI: 12.6    Ordering Provider: Serg Chen MD    
watchpat home sleep test was registered and mailed out today via [facility] mail.  Patient was notified by [employer] message  
Alert and oriented to person, place, time/situation. normal mood and affect. no apparent risk to self or others.

## 2022-11-28 ENCOUNTER — MYC MEDICAL ADVICE (OUTPATIENT)
Dept: SLEEP MEDICINE | Facility: CLINIC | Age: 52
End: 2022-11-28

## 2022-11-28 ENCOUNTER — VIRTUAL VISIT (OUTPATIENT)
Dept: SLEEP MEDICINE | Facility: CLINIC | Age: 52
End: 2022-11-28
Payer: COMMERCIAL

## 2022-11-28 DIAGNOSIS — G47.33 OBSTRUCTIVE SLEEP APNEA: Primary | ICD-10-CM

## 2022-11-28 PROCEDURE — 99215 OFFICE O/P EST HI 40 MIN: CPT | Mod: 95 | Performed by: STUDENT IN AN ORGANIZED HEALTH CARE EDUCATION/TRAINING PROGRAM

## 2022-11-28 ASSESSMENT — SLEEP AND FATIGUE QUESTIONNAIRES
HOW LIKELY ARE YOU TO NOD OFF OR FALL ASLEEP WHILE SITTING AND TALKING TO SOMEONE: WOULD NEVER DOZE
HOW LIKELY ARE YOU TO NOD OFF OR FALL ASLEEP WHILE SITTING QUIETLY AFTER LUNCH WITHOUT ALCOHOL: WOULD NEVER DOZE
HOW LIKELY ARE YOU TO NOD OFF OR FALL ASLEEP WHILE SITTING AND READING: WOULD NEVER DOZE
HOW LIKELY ARE YOU TO NOD OFF OR FALL ASLEEP WHILE LYING DOWN TO REST IN THE AFTERNOON WHEN CIRCUMSTANCES PERMIT: SLIGHT CHANCE OF DOZING
HOW LIKELY ARE YOU TO NOD OFF OR FALL ASLEEP WHEN YOU ARE A PASSENGER IN A CAR FOR AN HOUR WITHOUT A BREAK: WOULD NEVER DOZE
HOW LIKELY ARE YOU TO NOD OFF OR FALL ASLEEP WHILE SITTING INACTIVE IN A PUBLIC PLACE: WOULD NEVER DOZE
HOW LIKELY ARE YOU TO NOD OFF OR FALL ASLEEP IN A CAR, WHILE STOPPED FOR A FEW MINUTES IN TRAFFIC: WOULD NEVER DOZE
HOW LIKELY ARE YOU TO NOD OFF OR FALL ASLEEP WHILE WATCHING TV: WOULD NEVER DOZE

## 2022-11-28 NOTE — PROGRESS NOTES
CELESTINE is a 52 year old who is being evaluated via a billable video visit.      How would you like to obtain your AVS? MyChart  If the video visit is dropped, the invitation should be resent by: Send to e-mail at: [email or web address]  Will anyone else be joining your video visit? No    Video-Visit Details    Video Start Time: 4:01 PM    Type of service:  Video Visit    Video End Time:4:15 PM    Originating Location (pt. Location): Home    Distant Location (provider location):  On-site    Platform used for Video Visit: [facility]     Sleep clinic follow up visit note    Date on this visit: 11/28/2022    Primary Physician: Kelly Melo     History of present illness:  Charlie Zhang is a 52 year old male patient with no significant medical history who presents for Video Visit (Watchpat follow-up)  He has mild sleep apnea with an AHI of 12.6 per 11/8/2022 watchPAT study.  Sleep-related hypoxemia was not present. REM AHI was 20.6 and supine AHI 19.5.         EPWORTH SLEEPINESS SCALE    Isabel Sleepiness Scale ( DAYDAY Jackson  [id number]<br>ESS - [place]/English - Final version - 21 Nov 07 - [facility] Research [place].) 11/28/2022   Sitting and reading Would never doze   Watching TV Would never doze   Sitting, inactive in a public place (e.g. a theatre or a meeting) Would never doze   As a passenger in a car for an hour without a break Would never doze   Lying down to rest in the afternoon when circumstances permit Slight chance of dozing   Sitting and talking to someone Would never doze   Sitting quietly after a lunch without alcohol Would never doze   In a car, while stopped for a few minutes in traffic Would never doze   Isabel Score (MC) 1   Isabel Score (Sleep) 1       INSOMNIA SEVERITY INDEX (AKOSUA)    Insomnia Severity Index (AKOSUA) 11/28/2022   Difficulty falling asleep 1   Difficulty staying asleep 1   Problems waking up too early 2   How SATISFIED/DISSATISFIED are you with your CURRENT  sleep pattern? 1   How NOTICEABLE to others do you think your sleep problem is in terms of impairing the quality of your life? 4   How WORRIED/DISTRESSED are you about your current sleep problem? 2   To what extent do you consider your sleep problem to INTERFERE with your daily functioning (e.g. daytime fatigue, mood, ability to function at work/daily chores, concentration, memory, mood, etc.) CURRENTLY? 0   AKOSUA Total Score 11   Guidelines for Scoring/Interpretation:  Total score categories:  0-7 = No clinically significant insomnia   8-14 = Subthreshold insomnia   15-21 = Clinical insomnia (moderate severity)  22-28 = Clinical insomnia (severe)  Used via courtesy of www.Incentientealth.va.gov with permission from Gage Ballesteros PhD., [facility]      Allergies:    Allergies   Allergen Reactions     No Known Drug Allergies        Medications:    No current outpatient medications on file.       Problem List:  Patient Active Problem List    Diagnosis Date Noted     Obstructive sleep apnea 10/21/2022     Priority: Medium      seborrheic keratosis 07/31/2012     Priority: Medium        Past Medical/Surgical History:  Past Medical History:   Diagnosis Date     Hernia, abdominal      Obstructive sleep apnea 10/21/2022     Past Surgical History:   Procedure Laterality Date     COLONOSCOPY  2007    benign colon polyps     COLONOSCOPY N/A 4/13/2022    Procedure: COLONOSCOPY;  Surgeon: Guille Belcher DO;  Location: [facility]     HERNIORRHAPHY UMBILICAL N/A 7/15/2016    Procedure: HERNIORRHAPHY UMBILICAL;  Surgeon: Akil Granda MD;  [facility]     LAPAROSCOPIC HERNIORRHAPHY INGUINAL BILATERAL Bilateral 7/15/2016    Procedure: LAPAROSCOPIC HERNIORRHAPHY INGUINAL BILATERAL;  Surgeon: Akil Granda MD;  Location: [facility]       Social History:  Social History     Socioeconomic History     Marital status:      Spouse name: Agueda     Number of children: 2     Years of education: Not on file      Highest education level: Not on file   Occupational History     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 Not Asked   Social History Narrative     Not on file     Social Determinants of Health     Financial Resource Strain: Not on file   Food Insecurity: Not on file   Transportation Needs: Not on file   Physical Activity: Not on file   Stress: Not on file   Social Connections: Not on file   Intimate Partner Violence: Not on file   Housing Stability: Not on file       Family History:  Family History   Problem Relation Age of Onset     Cancer - colorectal Father      Prostate Cancer Father      Sleep Apnea Brother      Sleep Apnea Brother        Review of systems  A complete review of systems reviewed by me is negative with the exeption of what has been mentioned in the history of present illness.    Physical Examination:  Vitals: There were no vitals taken for this visit.  BMI= There is no height or weight on file to calculate BMI.  GENERAL: Healthy, alert and no distress  EYES: Eyes grossly normal to inspection.  No discharge or erythema, or obvious scleral/conjunctival abnormalities.  RESP: No audible wheeze, cough, or visible cyanosis.  No visible retractions or increased work of breathing.    SKIN: Visible skin clear. No significant rash, abnormal pigmentation or lesions.  NEURO: Cranial nerves grossly intact.  Mentation and speech appropriate for age.  PSYCH: Mentation appears normal, affect normal/bright, judgement and insight intact, normal speech and appearance well-groomed.           Other tests/labs:   I have reviewed the labs and personally reviewed the imaging below and made my comment in the assessment and  plan.    Assessment and Plan:    Problem List Items Addressed This Visit        Respiratory    Obstructive sleep apnea - Primary     Sleep study showed Mild obstructive sleep apnea. Sleep related hypoxemia was not present.  Additional findings from the sleep study showed abnormal findings including overall AHI 12.6, REM AHI 20.6, supine AHI 19.5.      Following discussion with patient a shared decision was made to begin therapy with oral appliance therapy.    Will place referral to sleep dentistry         Relevant Orders    Sleep Dental Referral       Patient was strongly advised to avoid driving, operating any heavy machinery or other hazardous situations while drowsy or sleepy.  Patient was counseled on the importance of driving while alert, to pull over if drowsy, or nap before getting into the vehicle if sleepy.      Plan is for Charlie Zhang to follow up in about 6 month(s).     The above note was dictated using voice recognition software. Although reviewed after completion, some word and grammatical error may remain . Please contact the author for any clarifications.    Total time spent reviewing medical records, history and physical examination, review of previous testing and interpretation as well as documentation on this date, 11/28/22: 45 minutes    Serg Chen MD on 11/28/2022   [facility]   Floor 1, [address]   [address] [place] [address]. [place] [address]   Appointments: [phone]    CC: No ref. provider found

## 2022-11-28 NOTE — PATIENT INSTRUCTIONS
DOCTOR : Serg Chen MD  SLEEP CENTER :      MY CONTACT NUMBER:    [facility]  [phone]      Dear Charlie Zhang    I am sending this message through [employer] so that you can have a explanation of your sleep study results and so that you may proceed with CPAP therapy immediately before our next clinic visit if you wish to do so. There are other therapies for sleep apnea which we discussed at your first visit and we can review these options if you prefer an alternative to CPAP.    Please also refer to your [employer] site to review the:     1. Sleep specialist  interpretation report of your study   2. Your instructions from your last visit on options for treatment  3.  Your phone contact information at the top of your instruction sheet    Your sleep study showed Mild obstructive sleep apnea. Sleep related hypoxemia was not present.  Additional findings from the sleep study can be discussed in more detail at your next visit/ showed abnormal findings including overall AHI 12.6, REM AHI 20.6, supine AHI 19.5.    Consider treatment with oral appliance therapy. A referral for sleep dentistry has been placed. I have provided you with a list of sleep dentist in the [facility] for you to choose from.     Please let me know if you want to move forward with this treatment    Please feel free to contact me through [employer] or through you center number if you have further questions before our next visit.    Serg Chen MD on 11/28/2022 at 4:39 PM        [facility]  Search engine: [email or web address].[email or web address].org/members/directory/search_bootstrap.php?org_id=[email or web address]&   Certified in Dental Sleep Medicine    Musa De Luna  Degree: KERRY  [address]  [address]  [place] [address]  Professional Phone: [phone]  Website: [email or web address]    Moisés García  Degree: KERRY  [facility]  [address]  [address]  [place]  [address]  Professional Phone: [phone]: [phone]    Rylie Lambert  [facility]  [address]  [place] [address]  Professional Phone: [phone]  Website: [email or web address]      Jona Tam  Degree: DDS  [address]  [address]  [place] [address]  Professional Phone: [phone]  Fax: [phone]    Kenney Muro  Degree: DDS  [facility] [place]  [address]  [place] [address]  Professional Phone: [phone]  Website: [email or web address]      Roy [facility]  [facility]  [address]  [address]  [place] [address]  Professional Phone: [phone]  Website: [email or web address]      Lisa Cunningham  Degree: DDS  [facility]  [address]  [place] [address]  Professional Phone: [phone]     Kayla Brady  Degree: DDS, PhD  [facility]  [address] [place] [address] [id number] [place] [address],   [address]   [place] [address]   Appointments: [phone]   Fax: [phone]   [facility]   [address] [place]   [address]   [place] [address]   Appointments: [phone]   Fax: [phone]                Elio Woods  Degree: DDS  [address] [place] [address]  Professional Phone: [phone]  Fax: [phone]  Website: [email or web address]      Jean eKlly  Degree: KERRY  [facility]  [address] [place] [address]    Mariona Mulet Pradera  Degree: DDMS CLAUDETTE  [facility], Oral Medicine, Dental [facility]  [address] [place] [address]  Professional Phone: [phone]      Jhoana Olivera  Degree: MS KERRY  [facility]  [address]  [address]  Professional Phone: [phone]    Echo Buenrostro  Degree: KERRY  [facility]  [address]  [address]  [place] [address]  [place] Office     Justin Barakat  Degree: KERRY  [facility]  [address] [place] [address]  Professional Phone: [phone]  Website: [email or web address]      Mao Bright  Degree: KERRY  [facility]  [address] [place] [address]  Professional Phone: [phone]  Fax: [phone]      Chaitanya Moreau  Degree: KERRY [facility]  [address]  [address]  [place] [address]                 ACCEPT MEDICARE  Tye Webster DDS  [address], [place] [address]  [phone]; [phone] (fax)  Eyeona    Torres Coyne DDS, MS   [facility]   [address]   [address]   [place] [address]   Appointments: [phone]   Fax: [phone]     Christoph Woods DDS   [address]   [place] [address]  Appointments [phone]      ADDITIONAL PROVIDERS    Bryan Hooker DDS   [facility]   [address]   [address]   [place] [address]   Appointments: [phone]   Fax: [phone]       Stanislav Florentino DDS, [facility]  [address] [place] [address] [address]  [place] [address]   Appointments: [phone] Ext: [phone]  Fax: [phone]   [email or web address]

## 2022-11-28 NOTE — ASSESSMENT & PLAN NOTE
Sleep study showed Mild obstructive sleep apnea. Sleep related hypoxemia was not present.  Additional findings from the sleep study showed abnormal findings including overall AHI 12.6, REM AHI 20.6, supine AHI 19.5.      Following discussion with patient a shared decision was made to begin therapy with oral appliance therapy.    Will place referral to sleep dentistry

## 2022-11-28 NOTE — PROCEDURES
"WatchPAT - HOME SLEEP STUDY INTERPRETATION    Patient: Charlie Zhang  MRN: [id number]  YOB: 1970  Study Date: 11/08/22  Referring Provider: Kelly Melo  Ordering Provider: Serg Chen MD    Chain of custody patient verification was not enabled.  Chain of custody verification was not present throughout the entire study.     Indications for Home Study: Charlie Zhang is a 52 year old male with no significant medical history who presents with symptoms suggestive of obstructive sleep apnea.    Estimated body mass index is 30.54 kg/m  as calculated from the following:    Height as of 10/21/22: 1.803 m (5' 11\").    Weight as of 10/21/22: 99.3 kg (219 lb).  Total score - [place]: 4 (10/18/2022  8:39 AM)  StopBang Total Score: 5 (10/21/2022  9:00 AM)Total Score: 5 (10/21/2022  9:00 AM)    Data: A full night home sleep study was performed recording the standard physiologic parameters including peripheral arterial tonometry (PAT), sound/snoring, body position,  movement, sound, and oxygen saturation by pulse oximetry. Pulse rate was estimated by oximetry recording. Sleep staging (wake, REM, light, and deep sleep) was derived from PAT signal.  This study was considered adequate based on > 4 hours of quality oximetry and respiratory recording. As specified by the AASM Manual for the Scoring of Sleep and Associated events, version 2.3, Rule VIII.D 1B, 4% oxygen desaturation scoring for hypopneas is used as a standard of care on all home sleep apnea testing.    Total Recording Time: 8 hrs, 27 min  Total Sleep Time: 7 hrs, 10 min  % of Sleep Time REM: 21.8%    Respiratory:  Snoring: Snoring was present.  Respiratory events: The PAT respiratory disturbance index [pRDI] was 17.2 events per hour.  The PAT apnea/hypopnea index [pAHI] was 12.6 events per hour.  MARGARITA was 10.6 events per hour.  During REM sleep the pAHI was 20.6.  Sleep Associated Hypoxemia: sustained hypoxemia was not present. " Mean oxygen saturation was 93%.  Minimum was 82%.  Time with saturation less than 88% was 1 minutes.    Heart Rate: By pulse oximetry normal rate was noted.     Position: Percent of time spent: supine - 61.2%, prone - 11.8%, on right - 0%, on left - 26.9%.  pAHI was 19.5 per hour supine, 3.6 per hour prone, 0 per hour on right side, and 1 per hour on left side.     Assessment:   Mild obstructive sleep apnea.  Sleep associated hypoxemia was not present.    Recommendations:  Consider oral appliance therapy or positional therapy.  Suggest optimizing sleep hygiene and avoiding sleep deprivation.  Weight management.    Diagnosis Code(s): Obstructive Sleep Apnea G47.33    Serg Chen MD, November 28, 2022   Diplomate, American Board of Internal Medicine, Sleep Medicine

## 2023-04-02 ENCOUNTER — HEALTH MAINTENANCE LETTER (OUTPATIENT)
Age: 53
End: 2023-04-02

## 2023-06-14 ASSESSMENT — ENCOUNTER SYMPTOMS
PARESTHESIAS: 0
CHILLS: 0
ABDOMINAL PAIN: 0
HEADACHES: 0
DIARRHEA: 0
NAUSEA: 0
JOINT SWELLING: 0
HEMATURIA: 0
DYSURIA: 0
FREQUENCY: 0
SHORTNESS OF BREATH: 0
DIZZINESS: 0
ARTHRALGIAS: 0
FEVER: 0
MYALGIAS: 0
EYE PAIN: 0
HEMATOCHEZIA: 0
NERVOUS/ANXIOUS: 0
SORE THROAT: 0
PALPITATIONS: 0
COUGH: 0
WEAKNESS: 0
HEARTBURN: 0
CONSTIPATION: 0

## 2023-06-21 ENCOUNTER — OFFICE VISIT (OUTPATIENT)
Dept: FAMILY MEDICINE | Facility: CLINIC | Age: 53
End: 2023-06-21
Payer: COMMERCIAL

## 2023-06-21 VITALS
HEIGHT: 71 IN | OXYGEN SATURATION: 96 % | TEMPERATURE: 97.7 F | HEART RATE: 86 BPM | DIASTOLIC BLOOD PRESSURE: 84 MMHG | WEIGHT: 222.4 LBS | SYSTOLIC BLOOD PRESSURE: 128 MMHG | BODY MASS INDEX: 31.14 KG/M2 | RESPIRATION RATE: 16 BRPM

## 2023-06-21 DIAGNOSIS — Z00.00 ROUTINE GENERAL MEDICAL EXAMINATION AT A HEALTH CARE FACILITY: Primary | ICD-10-CM

## 2023-06-21 DIAGNOSIS — Z23 NEED FOR SHINGLES VACCINE: ICD-10-CM

## 2023-06-21 DIAGNOSIS — Z13.6 CARDIOVASCULAR SCREENING; LDL GOAL LESS THAN 160: ICD-10-CM

## 2023-06-21 DIAGNOSIS — Z12.5 SCREENING FOR PROSTATE CANCER: ICD-10-CM

## 2023-06-21 DIAGNOSIS — R73.09 ABNORMAL GLUCOSE: ICD-10-CM

## 2023-06-21 DIAGNOSIS — R97.20 ELEVATED PROSTATE SPECIFIC ANTIGEN (PSA): ICD-10-CM

## 2023-06-21 DIAGNOSIS — G47.33 OBSTRUCTIVE SLEEP APNEA: ICD-10-CM

## 2023-06-21 LAB — HBA1C MFR BLD: 5.4 % (ref 0–5.6)

## 2023-06-21 PROCEDURE — 99396 PREV VISIT EST AGE 40-64: CPT | Mod: 25 | Performed by: FAMILY MEDICINE

## 2023-06-21 PROCEDURE — 80048 BASIC METABOLIC PNL TOTAL CA: CPT | Performed by: FAMILY MEDICINE

## 2023-06-21 PROCEDURE — 90471 IMMUNIZATION ADMIN: CPT | Performed by: FAMILY MEDICINE

## 2023-06-21 PROCEDURE — 36415 COLL VENOUS BLD VENIPUNCTURE: CPT | Performed by: FAMILY MEDICINE

## 2023-06-21 PROCEDURE — 80061 LIPID PANEL: CPT | Performed by: FAMILY MEDICINE

## 2023-06-21 PROCEDURE — 99213 OFFICE O/P EST LOW 20 MIN: CPT | Mod: 25 | Performed by: FAMILY MEDICINE

## 2023-06-21 PROCEDURE — 83036 HEMOGLOBIN GLYCOSYLATED A1C: CPT | Performed by: FAMILY MEDICINE

## 2023-06-21 PROCEDURE — 90750 HZV VACC RECOMBINANT IM: CPT | Performed by: FAMILY MEDICINE

## 2023-06-21 PROCEDURE — G0103 PSA SCREENING: HCPCS | Performed by: FAMILY MEDICINE

## 2023-06-21 ASSESSMENT — ENCOUNTER SYMPTOMS
WEAKNESS: 0
HEARTBURN: 0
MYALGIAS: 0
NERVOUS/ANXIOUS: 0
ARTHRALGIAS: 0
PARESTHESIAS: 0
FEVER: 0
SORE THROAT: 0
COUGH: 0
NAUSEA: 0
PALPITATIONS: 0
ABDOMINAL PAIN: 0
HEMATURIA: 0
JOINT SWELLING: 0
FREQUENCY: 0
DIARRHEA: 0
HEADACHES: 0
SHORTNESS OF BREATH: 0
CONSTIPATION: 0
DYSURIA: 0
HEMATOCHEZIA: 0
EYE PAIN: 0
DIZZINESS: 0
CHILLS: 0

## 2023-06-21 ASSESSMENT — PAIN SCALES - GENERAL: PAINLEVEL: NO PAIN (0)

## 2023-06-21 NOTE — PROGRESS NOTES
SUBJECTIVE:   CC: CELESTINE is an 52 year old who presents for preventative health visit.       6/21/2023     2:57 PM   Additional Questions   Roomed by Tali   Accompanied by self         6/21/2023     2:57 PM   Patient Reported Additional Medications   Patient reports taking the following new medications Zyrtec     Healthy Habits:     Getting at least 3 servings of Calcium per day:  NO    Bi-annual eye exam:  Yes    Dental care twice a year:  Yes    Sleep apnea or symptoms of sleep apnea:  Sleep apnea    Diet:  Regular (no restrictions)    Frequency of exercise:  4-5 days/week    Duration of exercise:  45-60 minutes    Taking medications regularly:  Yes    PHQ-2 Total Score: 0    Additional concerns today:  No              Today's PHQ-2 Score:       6/21/2023     2:56 PM   PHQ-2 ( 1999 Pfizer)   Q1: Little interest or pleasure in doing things 0   Q2: Feeling down, depressed or hopeless 0   PHQ-2 Score 0   Q1: Little interest or pleasure in doing things Not at all   Q2: Feeling down, depressed or hopeless Not at all   PHQ-2 Score 0           Social History     Tobacco Use     Smoking status: Never     Passive exposure: Never     Smokeless tobacco: Never   Substance Use Topics     Alcohol use: No             6/14/2023     8:59 AM   Alcohol Use   Prescreen: >3 drinks/day or >7 drinks/week? Not Applicable          No data to display                Last PSA:   Prostate Specific Antigen Screen   Date Value Ref Range Status   02/08/2022 5.18 (H) 0.00 - 4.00 ug/L Final       Reviewed orders with patient. Reviewed health maintenance and updated orders accordingly - Yes  Labs reviewed in EPIC    Reviewed and updated as needed this visit by clinical staff   Tobacco  Allergies               Reviewed and updated as needed this visit by Provider                     Review of Systems   Constitutional: Negative for chills and fever.   HENT: Negative for congestion, ear pain, hearing loss and sore throat.    Eyes: Negative for pain  "and visual disturbance.   Respiratory: Negative for cough and shortness of breath.    Cardiovascular: Negative for chest pain, palpitations and peripheral edema.   Gastrointestinal: Negative for abdominal pain, constipation, diarrhea, heartburn, hematochezia and nausea.   Genitourinary: Negative for dysuria, frequency, genital sores, hematuria, impotence, penile discharge and urgency.   Musculoskeletal: Negative for arthralgias, joint swelling and myalgias.   Skin: Negative for rash.   Neurological: Negative for dizziness, weakness, headaches and paresthesias.   Psychiatric/Behavioral: Negative for mood changes. The patient is not nervous/anxious.          OBJECTIVE:   /84   Pulse 86   Temp 97.7  F (36.5  C) (Temporal)   Resp 16   Ht 1.814 m (5' 11.42\")   Wt 100.9 kg (222 lb 6.4 oz)   SpO2 96%   BMI 30.66 kg/m      Physical Exam  GENERAL: Healthy, alert and no distress  EYES: Eyes grossly normal to inspection, conjunctivae and sclerae normal  RESP: Lungs clear to auscultation - no rales, rhonchi or wheezes  CV: Regular rate and rhythm, normal S1 S2, no murmur  MS: No gross musculoskeletal defects noted, no edema  NEURO: Normal strength and tone, mentation intact and speech normal  PSYCH: Mentation appears normal, affect normal/bright     Diagnostic Test Results:  Labs reviewed in Epic  Results for orders placed or performed in visit on 06/21/23   Basic metabolic panel  (Ca, Cl, CO2, Creat, Gluc, K, Na, BUN)     Status: Abnormal   Result Value Ref Range    Sodium 141 136 - 145 mmol/L    Potassium 4.2 3.4 - 5.3 mmol/L    Chloride 103 98 - 107 mmol/L    Carbon Dioxide (CO2) 26 22 - 29 mmol/L    Anion Gap 12 7 - 15 mmol/L    Urea Nitrogen 12.6 6.0 - 20.0 mg/dL    Creatinine 1.26 (H) 0.67 - 1.17 mg/dL    Calcium 9.5 8.6 - 10.0 mg/dL    Glucose 81 70 - 99 mg/dL    GFR Estimate 68 >60 mL/min/1.73m2   Hemoglobin A1c     Status: Normal   Result Value Ref Range    Hemoglobin A1C 5.4 0.0 - 5.6 %   Lipid panel " reflex to direct LDL Non-fasting     Status: Abnormal   Result Value Ref Range    Cholesterol 228 (H) <200 mg/dL    Triglycerides 240 (H) <150 mg/dL    Direct Measure HDL 33 (L) >=40 mg/dL    LDL Cholesterol Calculated 147 (H) <=100 mg/dL    Non HDL Cholesterol 195 (H) <130 mg/dL    Narrative    Cholesterol  Desirable:  <200 mg/dL    Triglycerides  Normal:  Less than 150 mg/dL  Borderline High:  150-199 mg/dL  High:  200-499 mg/dL  Very High:  Greater than or equal to 500 mg/dL    Direct Measure HDL  Female:  Greater than or equal to 50 mg/dL   Male:  Greater than or equal to 40 mg/dL    LDL Cholesterol  Desirable:  <100mg/dL  Above Desirable:  100-129 mg/dL   Borderline High:  130-159 mg/dL   High:  160-189 mg/dL   Very High:  >= 190 mg/dL    Non HDL Cholesterol  Desirable:  130 mg/dL  Above Desirable:  130-159 mg/dL  Borderline High:  160-189 mg/dL  High:  190-219 mg/dL  Very High:  Greater than or equal to 220 mg/dL   PSA, screen     Status: Abnormal   Result Value Ref Range    Prostate Specific Antigen Screen 7.41 (H) 0.00 - 3.50 ng/mL    Narrative    This result is obtained using the Roche Elecsys total PSA method on the corey e801 immunoassay analyzer. Results obtained with different assay methods or kits cannot be used interchangeably.       ASSESSMENT/PLAN:   (Z00.00) Routine general medical examination at a health care facility  (primary encounter diagnosis)  Comment:  Reviewed the need for periodic healthcare exams and screenings.   Plan: REVIEW OF HEALTH MAINTENANCE PROTOCOL ORDERS        Advise yearly physicals.    (Z13.6) CARDIOVASCULAR SCREENING; LDL GOAL LESS THAN 160  Comment: Elevated panel.  Plan: Lipid panel reflex to direct LDL Non-fasting        For now, monitor.  Focus on lifestyle, repeat lipid panel next year.  If still elevated, consider statin therapy.    (Z12.5) Screening for prostate cancer  Comment: elevated. Will place order for repeat prostate MRI and urology referral.   Plan: PSA,  "screen           (R73.09) Abnormal glucose  Comment: no signs of insulin resistance. Adequate renal function.   Lab Results   Component Value Date    A1C 5.4 06/21/2023        Plan: Basic metabolic panel  (Ca, Cl, CO2, Creat,         Gluc, K, Na, BUN), Hemoglobin A1c            (G47.33) Obstructive sleep apnea  Comment: advised consistent use of his CPAP  Plan: monitor.     (Z23) Need for shingles vaccine  Plan: ZOSTER RECOMBINANT ADJUVANTED (SHINGRIX)      (R97.20) Elevated prostate specific antigen (PSA)  Comment: Significant elevation.  Patient does have a prior prostate MRI and negative prostate biopsies.  However, given his increasing PSA, we will reevaluate.  Order placed for repeat MRI of the prostate, urology consult, possible repeat biopsy.  Plan: MR Prostate wo & w Contrast, Adult Urology          Referral                Patient has been advised of split billing requirements and indicates understanding: Yes      COUNSELING:   Reviewed preventive health counseling, as reflected in patient instructions       Regular exercise       Healthy diet/nutrition       Vision screening       Hearing screening       Colorectal cancer screening       Prostate cancer screening      BMI:   Estimated body mass index is 30.66 kg/m  as calculated from the following:    Height as of this encounter: 1.814 m (5' 11.42\").    Weight as of this encounter: 100.9 kg (222 lb 6.4 oz).   Weight management plan: Discussed healthy diet and exercise guidelines      He reports that he has never smoked. He has never been exposed to tobacco smoke. He has never used smokeless tobacco.        Kelly Melo MD  [facility]  "

## 2023-06-21 NOTE — PATIENT INSTRUCTIONS
Preventive Health Recommendations  Male Ages 50 - 64    Overall, you're doing well.     Blood tests today.     Stay active.     Yearly check up.     Good luck with your mom.     Yearly exam:             See your health care provider every year in order to  o   Review health changes.   o   Discuss preventive care.    o   Review your medicines if your doctor has prescribed any.   Have a cholesterol test every 5 years, or more frequently if you are at risk for high cholesterol/heart disease.   Have a diabetes test (fasting glucose) every three years. If you are at risk for diabetes, you should have this test more often.   Have a colonoscopy at age 50, or have a yearly FIT test (stool test). These exams will check for colon cancer.    Talk with your health care provider about whether or not a prostate cancer screening test (PSA) is right for you.  You should be tested each year for STDs (sexually transmitted diseases), if you re at risk.     Shots: Get a flu shot each year. Get a tetanus shot every 10 years.     Nutrition:  Eat at least 5 servings of fruits and vegetables daily.   Eat whole-grain bread, whole-wheat pasta and brown rice instead of white grains and rice.   Get adequate Calcium and Vitamin D.     Lifestyle  Exercise for at least 150 minutes a week (30 minutes a day, 5 days a week). This will help you control your weight and prevent disease.   Limit alcohol to one drink per day.   No smoking.   Wear sunscreen to prevent skin cancer.   See your dentist every six months for an exam and cleaning.   See your eye doctor every 1 to 2 years.

## 2023-06-22 LAB
ANION GAP SERPL CALCULATED.3IONS-SCNC: 12 MMOL/L (ref 7–15)
BUN SERPL-MCNC: 12.6 MG/DL (ref 6–20)
CALCIUM SERPL-MCNC: 9.5 MG/DL (ref 8.6–10)
CHLORIDE SERPL-SCNC: 103 MMOL/L (ref 98–107)
CHOLEST SERPL-MCNC: 228 MG/DL
CREAT SERPL-MCNC: 1.26 MG/DL (ref 0.67–1.17)
DEPRECATED HCO3 PLAS-SCNC: 26 MMOL/L (ref 22–29)
GFR SERPL CREATININE-BSD FRML MDRD: 68 ML/MIN/1.73M2
GLUCOSE SERPL-MCNC: 81 MG/DL (ref 70–99)
HDLC SERPL-MCNC: 33 MG/DL
LDLC SERPL CALC-MCNC: 147 MG/DL
NONHDLC SERPL-MCNC: 195 MG/DL
POTASSIUM SERPL-SCNC: 4.2 MMOL/L (ref 3.4–5.3)
PSA SERPL DL<=0.01 NG/ML-MCNC: 7.41 NG/ML (ref 0–3.5)
SODIUM SERPL-SCNC: 141 MMOL/L (ref 136–145)
TRIGL SERPL-MCNC: 240 MG/DL

## 2023-07-30 ENCOUNTER — ANCILLARY PROCEDURE (OUTPATIENT)
Dept: MRI IMAGING | Facility: CLINIC | Age: 53
End: 2023-07-30
Attending: FAMILY MEDICINE
Payer: COMMERCIAL

## 2023-07-30 DIAGNOSIS — R97.20 ELEVATED PROSTATE SPECIFIC ANTIGEN (PSA): ICD-10-CM

## 2023-07-30 PROCEDURE — A9585 GADOBUTROL INJECTION: HCPCS | Mod: JZ | Performed by: RADIOLOGY

## 2023-07-30 PROCEDURE — 72197 MRI PELVIS W/O & W/DYE: CPT | Performed by: RADIOLOGY

## 2023-07-30 RX ORDER — GADOBUTROL 604.72 MG/ML
0.1 INJECTION INTRAVENOUS ONCE
Status: COMPLETED | OUTPATIENT
Start: 2023-07-30 | End: 2023-07-30

## 2023-07-30 RX ADMIN — GADOBUTROL 10 ML: 604.72 INJECTION INTRAVENOUS at 13:21

## 2023-07-31 ENCOUNTER — MYC MEDICAL ADVICE (OUTPATIENT)
Dept: FAMILY MEDICINE | Facility: CLINIC | Age: 53
End: 2023-07-31

## 2023-08-14 ENCOUNTER — OFFICE VISIT (OUTPATIENT)
Dept: UROLOGY | Facility: CLINIC | Age: 53
End: 2023-08-14
Attending: FAMILY MEDICINE
Payer: COMMERCIAL

## 2023-08-14 VITALS
HEART RATE: 71 BPM | OXYGEN SATURATION: 98 % | SYSTOLIC BLOOD PRESSURE: 139 MMHG | WEIGHT: 222.2 LBS | DIASTOLIC BLOOD PRESSURE: 86 MMHG | BODY MASS INDEX: 30.63 KG/M2

## 2023-08-14 DIAGNOSIS — N40.1 BENIGN PROSTATIC HYPERPLASIA WITH LOWER URINARY TRACT SYMPTOMS, SYMPTOM DETAILS UNSPECIFIED: Primary | ICD-10-CM

## 2023-08-14 DIAGNOSIS — R97.20 ELEVATED PROSTATE SPECIFIC ANTIGEN (PSA): ICD-10-CM

## 2023-08-14 PROCEDURE — 99204 OFFICE O/P NEW MOD 45 MIN: CPT | Performed by: UROLOGY

## 2023-08-14 RX ORDER — ALFUZOSIN HYDROCHLORIDE 10 MG/1
10 TABLET, EXTENDED RELEASE ORAL DAILY
Qty: 90 TABLET | Refills: 3 | Status: SHIPPED | OUTPATIENT
Start: 2023-08-14

## 2023-08-14 RX ORDER — LEVOFLOXACIN 500 MG/1
500 TABLET, FILM COATED ORAL DAILY
Qty: 3 TABLET | Refills: 0 | Status: SHIPPED | OUTPATIENT
Start: 2023-08-14 | End: 2023-08-17

## 2023-08-14 NOTE — PATIENT INSTRUCTIONS
The Fusion biopsy is performed at our [facility] location every 1-2 months. Someone from the [place] location will contact you to schedule the procedure once we have an available date. You may not hear anything for several weeks. Please be patient with the process.  Please call our [place] office if you have questions or to update staff on any information, [phone].

## 2023-08-14 NOTE — PROGRESS NOTES
S: Charlie Zhang is a pleasant  52 year old male who was requested to be seen by  Kelly Melo for a consult with regard to patient's urinary complaints.  Patient complains of mild/mod LUTS.  He has history of elevated PSA with negative prostate biopsy in 2018.  Symptoms have been on going for several years(s).   His recent PSA was found to be   Prostate Specific Antigen Screen   Date Value Ref Range Status   06/21/2023 7.41 (H) 0.00 - 3.50 ng/mL Final   02/08/2022 5.18 (H) 0.00 - 4.00 ug/L Final   He had a prostate MRI on 7/23:  IMPRESSION:  1. Based on the most suspicious abnormality, this exam is  characterized as PIRADS 4 - Clinically significant cancer is likely to  be present.  The most suspicious abnormality is located at the left  transition zone apex and there is minimal capsular abutment with no  convincing evidence of extraprostatic extension.  2. No suspicious adenopathy or evidence of pelvic metastases.  3. Unchanged cyst near the prostatic apex extending toward the  perineum, which may represent a Mullerian duct cyst.  4. Left abductor insertion tendinopathy. Recommend clinical  correlation.  Current Outpatient Medications   Medication Sig Dispense Refill    alfuzosin ER (UROXATRAL) 10 MG 24 hr tablet Take 1 tablet (10 mg) by mouth daily 90 tablet 3    levofloxacin (LEVAQUIN) 500 MG tablet Take 1 tablet (500 mg) by mouth daily for 3 days To start one day before biopsy 3 tablet 0     Allergies   Allergen Reactions    No Known Drug Allergy      Past Medical History:   Diagnosis Date    Hernia, abdominal     Obstructive sleep apnea 10/21/2022     Past Surgical History:   Procedure Laterality Date    COLONOSCOPY  2007    benign colon polyps    COLONOSCOPY N/A 4/13/2022    Procedure: COLONOSCOPY;  Surgeon: Guille Belcher DO;  Location: [facility]    HERNIORRHAPHY UMBILICAL N/A 7/15/2016    Procedure: HERNIORRHAPHY UMBILICAL;  Surgeon: Akil Granda MD;  Location: [facility]     LAPAROSCOPIC HERNIORRHAPHY INGUINAL BILATERAL Bilateral 7/15/2016    Procedure: LAPAROSCOPIC HERNIORRHAPHY INGUINAL BILATERAL;  Surgeon: Akil Granda MD;  Location: [facility]      Family History   Problem Relation Age of Onset    Cancer - colorectal Father     Prostate Cancer Father     Sleep Apnea Brother     Sleep Apnea Brother     Diabetes Maternal Grandfather      He does have a family history of prostate cancer.  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 Yes     Comment: Brother has pace maker        REVIEW OF SYSTEMS  =================  C: NEGATIVE for fever, chills, change in weight  I: NEGATIVE for worrisome rashes, moles or lesions  E/M: NEGATIVE for ear, mouth and throat problems  R: NEGATIVE for significant cough or SHORTNESS OF BREATH  CV:  NEGATIVE for chest pain, palpitations or peripheral edema  GI: NEGATIVE for nausea, abdominal pain, heartburn, or change in bowel habits  NEURO: NEGATIVE numbness/weakness  : see HPI  PSYCH: NEGATIVE depression/anxiety  LYmph: no new enlarged lymph nodes  Ortho: no new trauma/movements           O: Exam:/86 (BP Location: Right arm, Patient Position: Chair, Cuff Size: Adult Large)   Pulse 71   Wt 100.8 kg (222 lb 3.2 oz)   SpO2 98%   BMI 30.63 kg/m     Constitutional: healthy, alert and no distress  Cardiovascular: negative, PMI normal.   Respiratory: negative, no evidence of  respiratory distress  Gastrointestinal: Abdomen soft, non-tender. BS normal. No masses, organomegaly  : normal male genitalia.  Prostate 60 gm smooth.    Musculoskeletal: extremities normal- no gross deformities noted, gait normal and normal muscle tone  Skin: no suspicious lesions or rashes  Neurologic: Alert and oriented  Psychiatric: mentation appears normal. and affect normal/bright  Hematologic/Lymphatic/Immunologic: normal ant/post cervical, axillary, supraclavicular and inguinal nodes    Assessment/Plan:   (N40.1) Benign prostatic hyperplasia with lower urinary tract symptoms, symptom details unspecified  (primary encounter diagnosis)  Comment:  prostate size 60 gms  Plan: alfuzosin ER (UROXATRAL) 10 MG 24 hr tablet         Side effects discussed    (R97.20) Elevated prostate specific antigen (PSA)  Comment: PIRADS 4 nodule on SHERRY  Plan: levofloxacin (LEVAQUIN) 500 MG tablet          MRI guided prostate biopsy next

## 2023-08-31 ENCOUNTER — OFFICE VISIT (OUTPATIENT)
Dept: UROLOGY | Facility: CLINIC | Age: 53
End: 2023-08-31
Payer: COMMERCIAL

## 2023-08-31 VITALS
HEART RATE: 72 BPM | HEIGHT: 71 IN | OXYGEN SATURATION: 98 % | WEIGHT: 220 LBS | DIASTOLIC BLOOD PRESSURE: 78 MMHG | SYSTOLIC BLOOD PRESSURE: 125 MMHG | BODY MASS INDEX: 30.8 KG/M2

## 2023-08-31 DIAGNOSIS — R97.20 ELEVATED PROSTATE SPECIFIC ANTIGEN (PSA): Primary | ICD-10-CM

## 2023-08-31 DIAGNOSIS — Z79.2 PROPHYLACTIC ANTIBIOTIC: ICD-10-CM

## 2023-08-31 PROCEDURE — G0416 PROSTATE BIOPSY, ANY MTHD: HCPCS | Performed by: PATHOLOGY

## 2023-08-31 PROCEDURE — 96372 THER/PROPH/DIAG INJ SC/IM: CPT | Mod: 59 | Performed by: UROLOGY

## 2023-08-31 PROCEDURE — 76942 ECHO GUIDE FOR BIOPSY: CPT | Performed by: UROLOGY

## 2023-08-31 PROCEDURE — 55700 PR BIOPSY OF PROSTATE,NEEDLE/PUNCH: CPT | Performed by: UROLOGY

## 2023-08-31 RX ORDER — GENTAMICIN 40 MG/ML
80 INJECTION, SOLUTION INTRAMUSCULAR; INTRAVENOUS ONCE
Status: COMPLETED | OUTPATIENT
Start: 2023-08-31 | End: 2023-08-31

## 2023-08-31 RX ADMIN — GENTAMICIN 80 MG: 40 INJECTION, SOLUTION INTRAMUSCULAR; INTRAVENOUS at 08:20

## 2023-08-31 NOTE — PATIENT INSTRUCTIONS
[facility]  Transrectal Ultrasound  Post Operative Information    The physician who performed your Transrectal Ultrasound is Dr. Simms (telephone number [phone] 8-4:30pm after hours please call [phone].  Please contact this doctor if you have any problems or questions.  If unable to reach your doctor, please return to the Emergency Department.    Take one antibiotic the evening of the procedure and then as directed on your prescription.  Drink at least 6-8 glasses of fluids for the first 48 hours.  Avoid heavy lifting and strenuous activity for 48 hours.  Avoid sexual intercourse for the first 24 hours.  No aspirin or ibuprofen products (Motrin, Advil, Nuprin, ect.) for one week.  You may take acetaminophen (Tylenol) for pain. You may take 2-500mg extra strength tylenol every 6 hours, not to exceed the daily recommendation of 4,000mg.   You may notice a small amount of blood on the tissue after a bowel movement.  You may pass blood with clots in your urine following the procedure.  The amount will decrease with time but may be visible for up to two weeks. If you have trouble urinating due to a possible blood clot, please call our office.  You make have blood in your semen for 4 weeks after the procedure.  You may experience mild perineal (groin area) discomfort after the procedure. If you were not prescribed a sedative, you may take a tub soak to alleviate groin discomfort.   Please call you doctor if you have any of the follow symptoms:  Fever over 101.1  Increase in the amount of blood passed, dark-red blood cherry color urine/Trouble Urinating  Severe discomfort or pain not well managed with methods above

## 2023-08-31 NOTE — LETTER
8/31/2023       RE: Charlie Zhang  [address] [address] [place] [address]     Dear Colleague,    Thank you for referring your patient, Charlie Zhang, to the [facility] [place] at [facility]. Please see a copy of my visit note below.    PREPROCEDURE DIAGNOSIS: Elevated PSA  POSTPROCEDURE DIAGNOSIS: Elevated PSA.   PROCEDURE: Transrectal ultrasound sizing and transrectal ultrasound guided prostatic needle biopsy, including MRI fusion targeting  for Malick Zhang who is a 52 year old male. PSA 7.41.  SURGEON: Kenney Simms  ANESTHESIA: 5 mL of 1% periprostatic block bilaterally   We previously obtained an MRI of the prostate and identified all PIRADS 3-5 lesions for targeting    Target Lesion #1 PIRADS 4 - left transition zone apex  Target Lesion #2 PIRADS 3    DESCRIPTION OF PROCEDURE: The procedure, the outcome, the anesthesia, and the risks were discussed with the patient. Informed consent was obtained and signed and a timeout was completed prior to the procedure. Digital rectal examination was performed with the below findings noted. Anesthesia was administered as noted above and the transrectal ultrasound probe was inserted, sizing was performed, and the below findings were noted. Two core biopsies were taken from each of the MRI targets, and 12 core biopsies were taken as described below. The probe was then withdrawn. Patient tolerated the procedure well.   FINDINGS: Digital rectal exam reveals normal prostate. Total volume is 55 mL. Hypoechoic lesion bilaterally. US images were obtained and then fused with the MRI images.  The fused images were then used to guide the biopsy of the targeted lesions with 2 cores taken of each lesion.  We then performed random biopsies.  12 cores were taken with 6 on each side, base, mid and apex.  PLAN: Will follow-up next week to review results.  Kenney Simms MD  Urology  [facility]

## 2023-08-31 NOTE — PROGRESS NOTES
PREPROCEDURE DIAGNOSIS: Elevated PSA  POSTPROCEDURE DIAGNOSIS: Elevated PSA.   PROCEDURE: Transrectal ultrasound sizing and transrectal ultrasound guided prostatic needle biopsy, including MRI fusion targeting  for Malick Zhang who is a 52 year old male. PSA 7.41.  SURGEON: Kenney Simms  ANESTHESIA: 5 mL of 1% periprostatic block bilaterally   We previously obtained an MRI of the prostate and identified all PIRADS 3-5 lesions for targeting    Target Lesion #1 PIRADS 4 - left transition zone apex  Target Lesion #2 PIRADS 3    DESCRIPTION OF PROCEDURE: The procedure, the outcome, the anesthesia, and the risks were discussed with the patient. Informed consent was obtained and signed and a timeout was completed prior to the procedure. Digital rectal examination was performed with the below findings noted. Anesthesia was administered as noted above and the transrectal ultrasound probe was inserted, sizing was performed, and the below findings were noted. Two core biopsies were taken from each of the MRI targets, and 12 core biopsies were taken as described below. The probe was then withdrawn. Patient tolerated the procedure well.   FINDINGS: Digital rectal exam reveals normal prostate. Total volume is 55 mL. Hypoechoic lesion bilaterally. US images were obtained and then fused with the MRI images.  The fused images were then used to guide the biopsy of the targeted lesions with 2 cores taken of each lesion.  We then performed random biopsies.  12 cores were taken with 6 on each side, base, mid and apex.  PLAN: Will follow-up next week to review results.  Kenney Simms MD  Urology  [facility]

## 2023-08-31 NOTE — NURSING NOTE
Chief Complaint   Patient presents with    Elevated PSA     Patient here today for Transrectals Ultrasound MRI- Guided Fusion Prostate Biopsy       Procedure was explained to patient prior to performing said procedure. The patient signed the consent form and all questions were answered prior to the procedure. Any pre-procedural antibiotics were given according to the performing physicians recommendation. Pt's information was confirmed on samples and samples were sent for analysis. Paient reviewed information on labels sent with patient and confirmed the accuracy of all the labels.    Consent read and signed: Yes     Allergies   Allergen Reactions    No Known Drug Allergy      Performing Physician: Dr. Simms  Antibiotic taken?  Yes  Aspirin or other blood thinning medications discontinued 7-10 days:  Yes  Time of Fleet's enema:  No, Ok by Dr Simms  Patient given Gentamicin intramuscular injection 30 minutes prior to procedure Yes    12 samples were taken from the right and left, medial and lateral base, mid, and apex of the prostate respectively.  Yes additional samples were taken from . Vitals were repeated prior to patient leaving and instructions for post Transrectals Ultrasound MRI- Guided Fusion Prostate Biopsy care were explained to the patient.      The following medication was given:     MEDICATION: Gentamicin   ROUTE: IM  SITE: LUQ - Gluteus  DOSE:80mg/2ml  LOT #: [id number]  : [employer]  EXPIRATION DATE: 07/24  NDC#:  6332-010-41  Was there drug waste? No  Multi-dose vial: Yes      The following medication was given:     MEDICATION:  Lidocaine 1% Soln  ROUTE: Local Infiltration / Prostate  SITE: RECTAL  DOSE: 10ML  LOT #: [id number]  : [employer]  EXPIRATION DATE: 01/01/2025  NDC#: 3264-9997-16  Was there drug waste? Yes  Amount of drug waste (mL): 10ML.  Reason for waste:  Single use vial  Multi-dose vial: Yes    Using a 1 1/2 inch 21 guage needle medication drawn up as ordered  with sterile syringe. Using sterile technique, a new 1 1/2 needle 21 gauge placed on syringe and patient cleansed with alcohol pad. Site was mapped out using palm of hand on the greater trochanter and forefinger on iliac crest. Using V technique between middle finger and index finger. Skin was tracted and Injection was given using a 90 degree angle dart method and after aspiration of needle and no blood, medication was slowly given via IM injection.  Patient tolerated injection well, and bandage placed on site following insertion removal.         TRES Hart

## 2023-09-01 LAB
PATH REPORT.COMMENTS IMP SPEC: ABNORMAL
PATH REPORT.COMMENTS IMP SPEC: ABNORMAL
PATH REPORT.COMMENTS IMP SPEC: YES
PATH REPORT.FINAL DX SPEC: ABNORMAL
PATH REPORT.GROSS SPEC: ABNORMAL
PATH REPORT.MICROSCOPIC SPEC OTHER STN: ABNORMAL
PATH REPORT.RELEVANT HX SPEC: ABNORMAL
PHOTO IMAGE: ABNORMAL

## 2023-09-07 ENCOUNTER — VIRTUAL VISIT (OUTPATIENT)
Dept: UROLOGY | Facility: CLINIC | Age: 53
End: 2023-09-07
Payer: COMMERCIAL

## 2023-09-07 VITALS — BODY MASS INDEX: 30.8 KG/M2 | WEIGHT: 220 LBS | HEIGHT: 71 IN

## 2023-09-07 DIAGNOSIS — C61 PROSTATE CANCER (H): Primary | ICD-10-CM

## 2023-09-07 DIAGNOSIS — R97.20 ELEVATED PROSTATE SPECIFIC ANTIGEN (PSA): ICD-10-CM

## 2023-09-07 PROCEDURE — 99214 OFFICE O/P EST MOD 30 MIN: CPT | Mod: VID | Performed by: UROLOGY

## 2023-09-07 ASSESSMENT — PAIN SCALES - GENERAL: PAINLEVEL: NO PAIN (0)

## 2023-09-07 NOTE — PROGRESS NOTES
Charlie Zhang is a 52 year old male who is being evaluated via a billable video visit.      How would you like to obtain your AVS? MyChart  If the video visit is dropped, the invitation should be resent by: my chart  Will anyone else be joining your video visit? No    Video Start Time: 12:43 PM    CHIEF COMPLAINT   It was my pleasure to see Charlie Zhang who is a 52 year old male for follow-up of Prostate Cancer.      HPI   Charlie Zhang is a very pleasant 52 year old male who presents with a history of Prostate Cancer. History of PSA elevation to 7.41. PIRADS 4 on MRI. Now s/p Fusion biopsy demonstrating Macey 3+3=6 prostate cancer from the MRI targets. No complications from biopsy.     PSA  6/21/2023 - 7.41  2/8/2022 - 5.18    Fusion Biopsy 8/31/2023  Final Diagnosis   A: Prostate, target #1, needle core biopsy:  - Adenocarcinoma, acinar type, grade group 1 (Macey score 3 + 3 = 6 of 10).  - Tumor involves 2 of 3 tissue core fragment(s) and approximately 30% of biopsy tissue (on this case part).     B: Prostate, target #2, needle core biopsy:  - Adenocarcinoma, acinar type, grade group 1 (Maskell score 3 + 3 = 6 of 10).  - Tumor involves 1 of 2 tissue core fragment(s) and approximately 10% of biopsy tissue (on this case part).     C: Prostate, left base, needle core biopsy:  - Negative for malignancy.     D: Prostate, left mid, needle core biopsy:  - Negative for malignancy.     E: Prostate, left apex, needle core biopsy:  - Negative for malignancy.     F: Prostate, right base, needle core biopsy:  - Negative for malignancy.     G: Prostate, right mid, needle core biopsy:  - Negative for malignancy.     H: Prostate, right apex, needle core biopsy:  - Negative for malignancy.         MRI Prostate 7/30/2023  IMPRESSION:Size: 60 grams   1. Based on the most suspicious abnormality, this exam is  characterized as PIRADS 4 - Clinically significant cancer is likely to  be present.  The most  suspicious abnormality is located at the left  transition zone apex and there is minimal capsular abutment with no  convincing evidence of extraprostatic extension.  2. No suspicious adenopathy or evidence of pelvic metastases.  3. Unchanged cyst near the prostatic apex extending toward the  perineum, which may represent a Mullerian duct cyst.  4. Left abductor insertion tendinopathy. Recommend clinical  correlation.         Allergies:   No known drug allergy         Review of Systems:  From intake questionnaire     Skin: negative  Eyes: negative  Ears/Nose/Throat: negative  Respiratory: No shortness of breath, dyspnea on exertion, cough, or hemoptysis  Cardiovascular: No chest pain or palpitations  Gastrointestinal: negative; no nausea/vomiting, constipation or diarrhea  Genitourinary: as per HPI  Musculoskeletal: negative  Neurologic: negative  Psychiatric: negative  Hematologic/Lymphatic/Immunologic: negative  Endocrine: negative         Physical Exam:     Vitals:  No vitals were obtained today due to virtual visit.    Physical Exam   GENERAL: Healthy, alert and no distress  EYES: Eyes grossly normal to inspection.  No discharge or erythema, or obvious scleral/conjunctival abnormalities.  RESP: No audible wheeze, cough, or visible cyanosis.  No visible retractions or increased work of breathing.    SKIN: Visible skin clear. No significant rash, abnormal pigmentation or lesions.  NEURO: Cranial nerves grossly intact.  Mentation and speech appropriate for age.  PSYCH: Mentation appears normal, affect normal/bright, judgement and insight intact, normal speech and appearance well-groomed.      Outside and Past Medical records:    Review of the result(s) of each unique test - PSA, MRI, pathology         Assessment and Plan:     Assessment: 52 year old male with new diagnosis of [place] 3+3=6 prostate cancer, PSA 7.41 and PIRADS 4 on MRI at diagnosis 8/31/2023. We had an extensive discussion about the significance of  localized, prostate cancer. We discussed the options for treatment of a localized prostate cancer including active surveillance, brachytherapy, external beam radiation therapy, and surgical removal.      We discussed the relative merits of robotic assisted radical prostatectomy. We also discussed the advantages and disadvantages and roles of open surgery vs. laparoscopic (and Da Jesse assisted) surgery. The anticipated post-operative course was explained, including an anticipated 1-2 day hospital stay. Catheter will remain in place 10-14 days.  The risks, benefits, alternatives, and personnel involved in the procedure were discussed. Specifically, we discussed that risks include but are not limited to anesthetic complications including stroke, MI, DVT/PE, as well as risk of bleeding, bowel injury, infection, lymphocele, urine leak and other potentially unforseen complications. Also discussed the risk of bladder neck contracture and other urinary symptoms after procedure. In addition, we discussed risk of urinary incontinence and erectile dysfunction following the procedure. Finally, we discussed risk for adverse pathologic features, including positive surgical margins and potential for post-surgical radiation, hormone ablation and long-term need for PSA monitoring,.  All questions were answered in detail.     We discussed that there was no clear evidence of advantage between surgery and radiation with regard to risk of recurrence. We discussed option for discussion with radiation oncology, but patient would prefer to defer this at this time.    The majority of our discussion related to active surveillance for prostate cancer. We discussed that this is a common treatment decision for men with very low and low risk prostate cancer and is endorsed by major urologic guidelines. We discussed the overall slow growth of most prostate cancers and the long history of data suggesting the safety of this approach. We discussed  the ProtecT trial and that this randomized study demonstrated no differences in survival for men comparing active surveillance to radiation or surgery. We discussed that surveillance typically involves PSA rechecks every 6 months with intermittent repeat MRIs and repeat biopsies. We also discussed that approximately half of men eventually pursue active treatment while on surveillance.    At this point, he would like to proceed with active surveillance.    Plan:  Follow-up in 3 months with PSA    Orders  Orders Placed This Encounter   Procedures    PSA tumor marker [[id number]]     Video-Visit Details    Type of service:  Video Visit    Video End Time:12:57 PM    Originating Location (pt. Location): Home    Distant Location (provider location):  On-site    Platform used for Video Visit: [employer]    14 minutes spent on the date of the encounter including direct interaction with the patient, performing chart review, history and exam, documentation and further activities as noted above.    Kenney Simms MD  Urology  [facility]

## 2023-09-07 NOTE — Clinical Note
9/7/2023       RE: Charlie Zhang  [id number] [facility] [address]     Dear Colleague,    Thank you for referring your patient, Charlie Zhang, to the [facility] [place] [facility]. Please see a copy of my visit note below.    Charlie Zhang is a 52 year old male who is being evaluated via a billable video visit.      How would you like to obtain your AVS? MyChart  If the video visit is dropped, the invitation should be resent by: my chart  Will anyone else be joining your video visit? No    Video Start Time: 12:43 PM    CHIEF COMPLAINT   It was my pleasure to see Charlie Zhang who is a 52 year old male for follow-up of Prostate Cancer.      HPI   Charlie Zhang is a very pleasant 52 year old male who presents with a history of Prostate Cancer. History of PSA elevation to 7.41. PIRADS 4 on MRI. Now s/p     PSA  6/21/2023 - 7.41  2/8/2022 - 5.18    Fusion Biopsy 8/31/2023  Final Diagnosis   A: Prostate, target #1, needle core biopsy:  - Adenocarcinoma, acinar type, grade group 1 ([place] score 3 + 3 = 6 of 10).  - Tumor involves 2 of 3 tissue core fragment(s) and approximately 30% of biopsy tissue (on this case part).     B: Prostate, target #2, needle core biopsy:  - Adenocarcinoma, acinar type, grade group 1 ([place] score 3 + 3 = 6 of 10).  - Tumor involves 1 of 2 tissue core fragment(s) and approximately 10% of biopsy tissue (on this case part).     C: Prostate, left base, needle core biopsy:  - Negative for malignancy.     D: Prostate, left mid, needle core biopsy:  - Negative for malignancy.     E: Prostate, left apex, needle core biopsy:  - Negative for malignancy.     F: Prostate, right base, needle core biopsy:  - Negative for malignancy.     G: Prostate, right mid, needle core biopsy:  - Negative for malignancy.     H: Prostate, right apex, needle core biopsy:  - Negative for malignancy.         MRI Prostate  7/30/2023  IMPRESSION:Size: 60 grams   1. Based on the most suspicious abnormality, this exam is  characterized as PIRADS 4 - Clinically significant cancer is likely to  be present.  The most suspicious abnormality is located at the left  transition zone apex and there is minimal capsular abutment with no  convincing evidence of extraprostatic extension.  2. No suspicious adenopathy or evidence of pelvic metastases.  3. Unchanged cyst near the prostatic apex extending toward the  perineum, which may represent a Mullerian duct cyst.  4. Left abductor insertion tendinopathy. Recommend clinical  correlation.         Allergies:   No known drug allergy         Review of Systems:  From intake questionnaire     Skin: negative  Eyes: negative  Ears/Nose/Throat: negative  Respiratory: No shortness of breath, dyspnea on exertion, cough, or hemoptysis  Cardiovascular: No chest pain or palpitations  Gastrointestinal: negative; no nausea/vomiting, constipation or diarrhea  Genitourinary: as per HPI  Musculoskeletal: negative  Neurologic: negative  Psychiatric: negative  Hematologic/Lymphatic/Immunologic: negative  Endocrine: negative         Physical Exam:     Vitals:  No vitals were obtained today due to virtual visit.    Physical Exam   GENERAL: Healthy, alert and no distress  EYES: Eyes grossly normal to inspection.  No discharge or erythema, or obvious scleral/conjunctival abnormalities.  RESP: No audible wheeze, cough, or visible cyanosis.  No visible retractions or increased work of breathing.    SKIN: Visible skin clear. No significant rash, abnormal pigmentation or lesions.  NEURO: Cranial nerves grossly intact.  Mentation and speech appropriate for age.  PSYCH: Mentation appears normal, affect normal/bright, judgement and insight intact, normal speech and appearance well-groomed.      Outside and Past Medical records:    Assessment requiring an independent historian(s) - {:147315}  Review of prior external note(s) from  - {note reviewed source:040706}  Review of the result(s) of each unique test - ***         Assessment and Plan:     Assessment:   {Diagnoses:089599}    Plan:  3 months with PSA    Orders  Orders Placed This Encounter   Procedures    PSA tumor marker [[id number]]         Video-Visit Details    Type of service:  Video Visit    Video End Time:12:57 PM    Originating Location (pt. Location): Home    Distant Location (provider location):  On-site    Platform used for Video Visit: CircuLite    *** minutes spent on the date of the encounter including direct interaction with the patient, performing chart review, history and exam, documentation and further activities as noted above.    Kenney Simms MD  Urology  [facility]      Charlie Zhang is a 52 year old male who is being evaluated via a billable video visit.      How would you like to obtain your AVS? MyChart  If the video visit is dropped, the invitation should be resent by: my chart  Will anyone else be joining your video visit? No    Video Start Time: 12:43 PM    CHIEF COMPLAINT   It was my pleasure to see Charlie Zhang who is a 52 year old male for follow-up of Prostate Cancer.      HPI   Charlie Zhang is a very pleasant 52 year old male who presents with a history of Prostate Cancer. History of PSA elevation to 7.41. PIRADS 4 on MRI. Now s/p Fusion biopsy demonstrating Kathleen 3+3=6 prostate cancer from the MRI targets. No complications from biopsy.     PSA  6/21/2023 - 7.41  2/8/2022 - 5.18    Fusion Biopsy 8/31/2023  Final Diagnosis   A: Prostate, target #1, needle core biopsy:  - Adenocarcinoma, acinar type, grade group 1 (Macey score 3 + 3 = 6 of 10).  - Tumor involves 2 of 3 tissue core fragment(s) and approximately 30% of biopsy tissue (on this case part).     B: Prostate, target #2, needle core biopsy:  - Adenocarcinoma, acinar type, grade group 1 (Macey score 3 + 3 = 6 of 10).  - Tumor involves 1 of 2 tissue core  fragment(s) and approximately 10% of biopsy tissue (on this case part).     C: Prostate, left base, needle core biopsy:  - Negative for malignancy.     D: Prostate, left mid, needle core biopsy:  - Negative for malignancy.     E: Prostate, left apex, needle core biopsy:  - Negative for malignancy.     F: Prostate, right base, needle core biopsy:  - Negative for malignancy.     G: Prostate, right mid, needle core biopsy:  - Negative for malignancy.     H: Prostate, right apex, needle core biopsy:  - Negative for malignancy.         MRI Prostate 7/30/2023  IMPRESSION:Size: 60 grams   1. Based on the most suspicious abnormality, this exam is  characterized as PIRADS 4 - Clinically significant cancer is likely to  be present.  The most suspicious abnormality is located at the left  transition zone apex and there is minimal capsular abutment with no  convincing evidence of extraprostatic extension.  2. No suspicious adenopathy or evidence of pelvic metastases.  3. Unchanged cyst near the prostatic apex extending toward the  perineum, which may represent a Mullerian duct cyst.  4. Left abductor insertion tendinopathy. Recommend clinical  correlation.         Allergies:   No known drug allergy         Review of Systems:  From intake questionnaire     Skin: negative  Eyes: negative  Ears/Nose/Throat: negative  Respiratory: No shortness of breath, dyspnea on exertion, cough, or hemoptysis  Cardiovascular: No chest pain or palpitations  Gastrointestinal: negative; no nausea/vomiting, constipation or diarrhea  Genitourinary: as per HPI  Musculoskeletal: negative  Neurologic: negative  Psychiatric: negative  Hematologic/Lymphatic/Immunologic: negative  Endocrine: negative         Physical Exam:     Vitals:  No vitals were obtained today due to virtual visit.    Physical Exam   GENERAL: Healthy, alert and no distress  EYES: Eyes grossly normal to inspection.  No discharge or erythema, or obvious scleral/conjunctival  abnormalities.  RESP: No audible wheeze, cough, or visible cyanosis.  No visible retractions or increased work of breathing.    SKIN: Visible skin clear. No significant rash, abnormal pigmentation or lesions.  NEURO: Cranial nerves grossly intact.  Mentation and speech appropriate for age.  PSYCH: Mentation appears normal, affect normal/bright, judgement and insight intact, normal speech and appearance well-groomed.      Outside and Past Medical records:    Review of the result(s) of each unique test - PSA, MRI, pathology         Assessment and Plan:     Assessment: 52 year old male with new diagnosis of Macey 3+3=6 prostate cancer, PSA 7.41 and PIRADS 4 on MRI at diagnosis 8/31/2023. We had an extensive discussion about the significance of localized, prostate cancer. We discussed the options for treatment of a localized prostate cancer including active surveillance, brachytherapy, external beam radiation therapy, and surgical removal.      We discussed the relative merits of robotic assisted radical prostatectomy. We also discussed the advantages and disadvantages and roles of open surgery vs. laparoscopic (and Da Jesse assisted) surgery. The anticipated post-operative course was explained, including an anticipated 1-2 day hospital stay. Catheter will remain in place 10-14 days.  The risks, benefits, alternatives, and personnel involved in the procedure were discussed. Specifically, we discussed that risks include but are not limited to anesthetic complications including stroke, MI, DVT/PE, as well as risk of bleeding, bowel injury, infection, lymphocele, urine leak and other potentially unforseen complications. Also discussed the risk of bladder neck contracture and other urinary symptoms after procedure. In addition, we discussed risk of urinary incontinence and erectile dysfunction following the procedure. Finally, we discussed risk for adverse pathologic features, including positive surgical margins and  potential for post-surgical radiation, hormone ablation and long-term need for PSA monitoring,.  All questions were answered in detail.     We discussed that there was no clear evidence of advantage between surgery and radiation with regard to risk of recurrence. We discussed option for discussion with radiation oncology, but patient would prefer to defer this at this time.    The majority of our discussion related to active surveillance for prostate cancer. We discussed that this is a common treatment decision for men with very low and low risk prostate cancer and is endorsed by major urologic guidelines. We discussed the overall slow growth of most prostate cancers and the long history of data suggesting the safety of this approach. We discussed the ProtecT trial and that this randomized study demonstrated no differences in survival for men comparing active surveillance to radiation or surgery. We discussed that surveillance typically involves PSA rechecks every 6 months with intermittent repeat MRIs and repeat biopsies. We also discussed that approximately half of men eventually pursue active treatment while on surveillance.    At this point, he would like to proceed with active surveillance.    Plan:  Follow-up in 3 months with PSA    Orders  Orders Placed This Encounter   Procedures     PSA tumor marker [[id number]]     Video-Visit Details    Type of service:  Video Visit    Video End Time:12:57 PM    Originating Location (pt. Location): Home    Distant Location (provider location):  On-site    Platform used for Video Visit: [x+1]    14 minutes spent on the date of the encounter including direct interaction with the patient, performing chart review, history and exam, documentation and further activities as noted above.    Kenney Simms MD  Urology  [facility]        Again, thank you for allowing me to participate in the care of your patient.      Sincerely,    Kenney Simms MD

## 2023-09-11 ENCOUNTER — MYC MEDICAL ADVICE (OUTPATIENT)
Dept: FAMILY MEDICINE | Facility: CLINIC | Age: 53
End: 2023-09-11
Payer: COMMERCIAL

## 2023-09-12 NOTE — TELEPHONE ENCOUNTER
"Routing MyChart to PCP regarding ongoing conversation about appointment for \"lower body problems\".    Shane Williamson, RN  Triage Nurse  [facility], Family Practice    "

## 2023-09-13 ENCOUNTER — TELEPHONE (OUTPATIENT)
Dept: UROLOGY | Facility: CLINIC | Age: 53
End: 2023-09-13
Payer: COMMERCIAL

## 2023-09-13 PROBLEM — C61 PROSTATE CANCER (H): Status: ACTIVE | Noted: 2023-09-13

## 2023-09-13 NOTE — TELEPHONE ENCOUNTER
----- Message from Ely Portillo sent at 9/12/2023  8:40 AM CDT -----  Regarding: 3 month follow up  Return in about 3 months (around 12/7/2023). With KASIA VINSON  9/7/23    
Fast, difficult to interrupt

## 2023-10-25 ENCOUNTER — TRANSFERRED RECORDS (OUTPATIENT)
Dept: HEALTH INFORMATION MANAGEMENT | Facility: CLINIC | Age: 53
End: 2023-10-25
Payer: COMMERCIAL

## 2024-02-28 ENCOUNTER — TRANSFERRED RECORDS (OUTPATIENT)
Dept: HEALTH INFORMATION MANAGEMENT | Facility: CLINIC | Age: 54
End: 2024-02-28
Payer: COMMERCIAL

## 2024-08-17 ENCOUNTER — HEALTH MAINTENANCE LETTER (OUTPATIENT)
Age: 54
End: 2024-08-17

## 2024-10-11 ENCOUNTER — TRANSFERRED RECORDS (OUTPATIENT)
Dept: HEALTH INFORMATION MANAGEMENT | Facility: CLINIC | Age: 54
End: 2024-10-11
Payer: COMMERCIAL

## 2024-11-09 ENCOUNTER — MYC MEDICAL ADVICE (OUTPATIENT)
Dept: FAMILY MEDICINE | Facility: CLINIC | Age: 54
End: 2024-11-09
Payer: COMMERCIAL

## 2024-11-09 DIAGNOSIS — G47.33 OSA (OBSTRUCTIVE SLEEP APNEA): Primary | ICD-10-CM

## 2024-11-18 ENCOUNTER — MYC MEDICAL ADVICE (OUTPATIENT)
Dept: FAMILY MEDICINE | Facility: CLINIC | Age: 54
End: 2024-11-18
Payer: COMMERCIAL

## 2025-03-14 SDOH — HEALTH STABILITY: PHYSICAL HEALTH: ON AVERAGE, HOW MANY DAYS PER WEEK DO YOU ENGAGE IN MODERATE TO STRENUOUS EXERCISE (LIKE A BRISK WALK)?: 5 DAYS

## 2025-03-14 SDOH — HEALTH STABILITY: PHYSICAL HEALTH: ON AVERAGE, HOW MANY MINUTES DO YOU ENGAGE IN EXERCISE AT THIS LEVEL?: 70 MIN

## 2025-03-14 ASSESSMENT — SOCIAL DETERMINANTS OF HEALTH (SDOH): HOW OFTEN DO YOU GET TOGETHER WITH FRIENDS OR RELATIVES?: TWICE A WEEK

## 2025-03-19 ENCOUNTER — OFFICE VISIT (OUTPATIENT)
Dept: FAMILY MEDICINE | Facility: CLINIC | Age: 55
End: 2025-03-19
Payer: COMMERCIAL

## 2025-03-19 VITALS
HEIGHT: 72 IN | OXYGEN SATURATION: 97 % | BODY MASS INDEX: 29.47 KG/M2 | WEIGHT: 217.6 LBS | SYSTOLIC BLOOD PRESSURE: 122 MMHG | HEART RATE: 74 BPM | DIASTOLIC BLOOD PRESSURE: 76 MMHG | TEMPERATURE: 97.6 F | RESPIRATION RATE: 18 BRPM

## 2025-03-19 DIAGNOSIS — Z13.1 SCREENING FOR DIABETES MELLITUS: ICD-10-CM

## 2025-03-19 DIAGNOSIS — R93.89 ABNORMAL CHEST X-RAY: ICD-10-CM

## 2025-03-19 DIAGNOSIS — Z00.00 ROUTINE GENERAL MEDICAL EXAMINATION AT A HEALTH CARE FACILITY: Primary | ICD-10-CM

## 2025-03-19 DIAGNOSIS — Z13.220 SCREENING FOR HYPERLIPIDEMIA: ICD-10-CM

## 2025-03-19 PROBLEM — Z86.0100 HISTORY OF COLONIC POLYPS: Status: ACTIVE | Noted: 2019-06-10

## 2025-03-19 PROBLEM — Z80.42 FAMILY HISTORY OF MALIGNANT NEOPLASM OF PROSTATE: Status: ACTIVE | Noted: 2019-06-10

## 2025-03-19 PROBLEM — E80.4 GILBERT'S DISEASE: Status: ACTIVE | Noted: 2019-06-10

## 2025-03-19 PROBLEM — Z80.0 FH: COLON CANCER: Status: ACTIVE | Noted: 2019-06-10

## 2025-03-19 LAB
EST. AVERAGE GLUCOSE BLD GHB EST-MCNC: 103 MG/DL
HBA1C MFR BLD: 5.2 % (ref 0–5.6)

## 2025-03-19 PROCEDURE — 83036 HEMOGLOBIN GLYCOSYLATED A1C: CPT | Performed by: STUDENT IN AN ORGANIZED HEALTH CARE EDUCATION/TRAINING PROGRAM

## 2025-03-19 PROCEDURE — 99213 OFFICE O/P EST LOW 20 MIN: CPT | Mod: 25 | Performed by: STUDENT IN AN ORGANIZED HEALTH CARE EDUCATION/TRAINING PROGRAM

## 2025-03-19 PROCEDURE — 99396 PREV VISIT EST AGE 40-64: CPT | Performed by: STUDENT IN AN ORGANIZED HEALTH CARE EDUCATION/TRAINING PROGRAM

## 2025-03-19 PROCEDURE — 36415 COLL VENOUS BLD VENIPUNCTURE: CPT | Performed by: STUDENT IN AN ORGANIZED HEALTH CARE EDUCATION/TRAINING PROGRAM

## 2025-03-19 NOTE — PATIENT INSTRUCTIONS
Patient Education   Preventive Care Advice   This is general advice given by our system to help you stay healthy. However, your care team may have specific advice just for you. Please talk to your care team about your preventive care needs.  Nutrition  Eat 5 or more servings of fruits and vegetables each day.  Try wheat bread, brown rice and whole grain pasta (instead of white bread, rice, and pasta).  Get enough calcium and vitamin D. Check the label on foods and aim for 100% of the RDA (recommended daily allowance).  Lifestyle  Exercise at least 150 minutes each week  (30 minutes a day, 5 days a week).  Do muscle strengthening activities 2 days a week. These help control your weight and prevent disease.  No smoking.  Wear sunscreen to prevent skin cancer.  Have a dental exam and cleaning every 6 months.  Yearly exams  See your health care team every year to talk about:  Any changes in your health.  Any medicines your care team has prescribed.  Preventive care, family planning, and ways to prevent chronic diseases.  Shots (vaccines)   HPV shots (up to age 26), if you've never had them before.  Hepatitis B shots (up to age 59), if you've never had them before.  COVID-19 shot: Get this shot when it's due.  Flu shot: Get a flu shot every year.  Tetanus shot: Get a tetanus shot every 10 years.  Pneumococcal, hepatitis A, and RSV shots: Ask your care team if you need these based on your risk.  Shingles shot (for age 50 and up)  General health tests  Diabetes screening:  Starting at age 35, Get screened for diabetes at least every 3 years.  If you are younger than age 35, ask your care team if you should be screened for diabetes.  Cholesterol test: At age 39, start having a cholesterol test every 5 years, or more often if advised.  Bone density scan (DEXA): At age 50, ask your care team if you should have this scan for osteoporosis (brittle bones).  Hepatitis C: Get tested at least once in your life.  STIs (sexually  transmitted infections)  Before age 24: Ask your care team if you should be screened for STIs.  After age 24: Get screened for STIs if you're at risk. You are at risk for STIs (including HIV) if:  You are sexually active with more than one person.  You don't use condoms every time.  You or a partner was diagnosed with a sexually transmitted infection.  If you are at risk for HIV, ask about PrEP medicine to prevent HIV.  Get tested for HIV at least once in your life, whether you are at risk for HIV or not.  Cancer screening tests  Cervical cancer screening: If you have a cervix, begin getting regular cervical cancer screening tests starting at age 21.  Breast cancer scan (mammogram): If you've ever had breasts, begin having regular mammograms starting at age 40. This is a scan to check for breast cancer.  Colon cancer screening: It is important to start screening for colon cancer at age 45.  Have a colonoscopy test every 10 years (or more often if you're at risk) Or, ask your provider about stool tests like a FIT test every year or Cologuard test every 3 years.  To learn more about your testing options, visit:   .  For help making a decision, visit:   https://bit.ly/xv18008.  Prostate cancer screening test: If you have a prostate, ask your care team if a prostate cancer screening test (PSA) at age 55 is right for you.  Lung cancer screening: If you are a current or former smoker ages 50 to 80, ask your care team if ongoing lung cancer screenings are right for you.  For informational purposes only. Not to replace the advice of your health care provider. Copyright   2023 [facility]. All rights reserved. Clinically reviewed by the [facility] Transitions Program. [employer] 740805 - REV 01/24.

## 2025-03-19 NOTE — PROGRESS NOTES
"Preventive Care Visit  [facility] GERMÁN Howell MD, Family Medicine  Mar 19, 2025    Assessment & Plan     Routine general medical examination at a health care facility    - REVIEW OF HEALTH MAINTENANCE PROTOCOL ORDERS  - PRIMARY CARE FOLLOW-UP SCHEDULING; Future  - Comprehensive metabolic panel (BMP + Alb, Alk Phos, ALT, AST, Total. Bili, TP); Future    Abnormal chest x-ray  Respiratory symptoms are improving.continue symptomatic treatment.  - XR Chest 2 Views; Future    Screening for hyperlipidemia    - Lipid panel reflex to direct LDL Fasting; Future    Screening for diabetes mellitus    - Hemoglobin A1c; Future    Patient has been advised of split billing requirements and indicates understanding: Yes        BMI  Estimated body mass index is 29.67 kg/m  as calculated from the following:    Height as of this encounter: 1.824 m (5' 11.81\").    Weight as of this encounter: 98.7 kg (217 lb 9.6 oz).   Weight management plan: Discussed healthy diet and exercise guidelines    Counseling  Appropriate preventive services were addressed with this patient via screening, questionnaire, or discussion as appropriate for fall prevention, nutrition, physical activity, Tobacco-use cessation, social engagement, weight loss and cognition.  Checklist reviewing preventive services available has been given to the patient.  Reviewed patient's diet, addressing concerns and/or questions.   The patient was instructed to see the dentist every 6 months.           Ale SPRAGUE is a 54 year old, presenting for the following:  Physical and RECHECK (Lungs )        3/19/2025     7:43 AM   Additional Questions   Roomed by Tali   Accompanied by self          HPI  chest X-ray show a spot on one of my lungs. They wanted me to follow up 4-6 weeks with my primary care.           3/14/2025   General Health   How would you rate your overall physical health? Good   Feel stress (tense, anxious, or unable to sleep) " Only a little   (!) STRESS CONCERN      3/14/2025   Nutrition   Three or more servings of calcium each day? (!) NO   Diet: Regular (no restrictions)   How many servings of fruit and vegetables per day? (!) 0-1   How many sweetened beverages each day? 0-1         3/14/2025   Exercise   Days per week of moderate/strenous exercise 5 days   Average minutes spent exercising at this level 70 min         3/14/2025   Social Factors   Frequency of gathering with friends or relatives Twice a week   Worry food won't last until get money to buy more No   Food not last or not have enough money for food? No   Do you have housing? (Housing is defined as stable permanent housing and does not include staying ouside in a car, in a tent, in an abandoned building, in an overnight shelter, or couch-surfing.) Yes   Are you worried about losing your housing? No   Lack of transportation? No   Unable to get utilities (heat,electricity)? No         3/14/2025   Fall Risk   Fallen 2 or more times in the past year? No   Trouble with walking or balance? No          3/14/2025   Dental   Dentist two times every year? (!) NO           Today's PHQ-2 Score:       3/19/2025     7:40 AM   PHQ-2 ( 1999 [employer])   Q1: Little interest or pleasure in doing things 0   Q2: Feeling down, depressed or hopeless 0   PHQ-2 Score 0    Q1: Little interest or pleasure in doing things Not at all   Q2: Feeling down, depressed or hopeless Not at all   PHQ-2 Score 0       Patient-reported           3/14/2025   Substance Use   Alcohol more than 3/day or more than 7/wk Not Applicable   Do you use any other substances recreationally? No     Social History     Tobacco Use    Smoking status: Never     Passive exposure: Never    Smokeless tobacco: Never   Vaping Use    Vaping status: Never Used   Substance Use Topics    Alcohol use: No    Drug use: No             3/14/2025   One time HIV Screening   Previous HIV test? No         3/14/2025   STI Screening   New sexual  "partner(s) since last STI/HIV test? (!) DECLINE   Last PSA:   Prostate Specific Antigen Screen   Date Value Ref Range Status   06/21/2023 7.41 (H) 0.00 - 3.50 ng/mL Final   02/08/2022 5.18 (H) 0.00 - 4.00 ug/L Final     ASCVD Risk   The 10-year ASCVD risk score (Mariella LUNA, et al., 2019) is: 8.6%    Values used to calculate the score:      Age: 54 years      Sex: Male      Is Non- : No      Diabetic: No      Tobacco smoker: No      Systolic Blood Pressure: 122 mmHg      Is BP treated: No      HDL Cholesterol: 33 mg/dL      Total Cholesterol: 228 mg/dL          Reviewed and updated as needed this visit by Provider      Past Medical History:   Diagnosis Date    Hernia, abdominal     Obstructive sleep apnea 10/21/2022    Prostate cancer (H) 9/13/2023     Past Surgical History:   Procedure Laterality Date    COLONOSCOPY  2007    benign colon polyps    COLONOSCOPY N/A 4/13/2022    Procedure: COLONOSCOPY;  Surgeon: Guille Belcher DO;  Location: [facility]    HERNIORRHAPHY UMBILICAL N/A 7/15/2016    Procedure: HERNIORRHAPHY UMBILICAL;  Surgeon: Akil Granda MD;  Location: [facility]    LAPAROSCOPIC HERNIORRHAPHY INGUINAL BILATERAL Bilateral 7/15/2016    Procedure: LAPAROSCOPIC HERNIORRHAPHY INGUINAL BILATERAL;  Surgeon: Akil Granda MD;  Location: [facility]     OB History   No obstetric history on file.     Lab work is in process      Review of Systems  Constitutional, HEENT, cardiovascular, pulmonary, GI, , musculoskeletal, neuro, skin, endocrine and psych systems are negative, except as otherwise noted.     Objective    Exam  /76   Pulse 74   Temp 97.6  F (36.4  C) (Temporal)   Resp 18   Ht 1.824 m (5' 11.81\")   Wt 98.7 kg (217 lb 9.6 oz)   SpO2 97%   BMI 29.67 kg/m     Estimated body mass index is 29.67 kg/m  as calculated from the following:    Height as of this encounter: 1.824 m (5' 11.81\").    Weight as of this encounter: 98.7 kg (217 lb 9.6 " oz).    Physical Exam  GENERAL: alert and no distress  EYES: Eyes grossly normal to inspection, PERRL and conjunctivae and sclerae normal  HENT: ear canals and TM's normal, nose and mouth without ulcers or lesions  RESP: lungs clear to auscultation - no rales, rhonchi or wheezes  CV: regular rate and rhythm, normal S1 S2, no S3 or S4, no murmur, click or rub, no peripheral edema  ABDOMEN: soft, nontender, no hepatosplenomegaly, no masses and bowel sounds normal  MS: no gross musculoskeletal defects noted, no edema  SKIN: no suspicious lesions or rashes  NEURO: Normal strength and tone, mentation intact and speech normal  PSYCH: mentation appears normal, affect normal/bright        Signed Electronically by: Mellissa Howell MD

## 2025-03-20 LAB
ALBUMIN SERPL BCG-MCNC: 4.3 G/DL (ref 3.5–5.2)
ALP SERPL-CCNC: 78 U/L (ref 40–150)
ALT SERPL W P-5'-P-CCNC: 27 U/L (ref 0–70)
ANION GAP SERPL CALCULATED.3IONS-SCNC: 11 MMOL/L (ref 7–15)
AST SERPL W P-5'-P-CCNC: 25 U/L (ref 0–45)
BILIRUB SERPL-MCNC: 0.7 MG/DL
BUN SERPL-MCNC: 15 MG/DL (ref 6–20)
CALCIUM SERPL-MCNC: 9.3 MG/DL (ref 8.8–10.4)
CHLORIDE SERPL-SCNC: 102 MMOL/L (ref 98–107)
CHOLEST SERPL-MCNC: 155 MG/DL
CREAT SERPL-MCNC: 1.36 MG/DL (ref 0.67–1.17)
EGFRCR SERPLBLD CKD-EPI 2021: 61 ML/MIN/1.73M2
FASTING STATUS PATIENT QL REPORTED: YES
FASTING STATUS PATIENT QL REPORTED: YES
GLUCOSE SERPL-MCNC: 108 MG/DL (ref 70–99)
HCO3 SERPL-SCNC: 26 MMOL/L (ref 22–29)
HDLC SERPL-MCNC: 29 MG/DL
LDLC SERPL CALC-MCNC: 95 MG/DL
NONHDLC SERPL-MCNC: 126 MG/DL
POTASSIUM SERPL-SCNC: 4.6 MMOL/L (ref 3.4–5.3)
PROT SERPL-MCNC: 7.2 G/DL (ref 6.4–8.3)
SODIUM SERPL-SCNC: 139 MMOL/L (ref 135–145)
TRIGL SERPL-MCNC: 153 MG/DL

## 2025-04-07 ENCOUNTER — ANCILLARY PROCEDURE (OUTPATIENT)
Dept: GENERAL RADIOLOGY | Facility: CLINIC | Age: 55
End: 2025-04-07
Attending: STUDENT IN AN ORGANIZED HEALTH CARE EDUCATION/TRAINING PROGRAM
Payer: COMMERCIAL

## 2025-04-07 DIAGNOSIS — R93.89 ABNORMAL CHEST X-RAY: ICD-10-CM

## 2025-04-07 PROCEDURE — 71046 X-RAY EXAM CHEST 2 VIEWS: CPT | Mod: TC | Performed by: RADIOLOGY

## 2025-06-18 ENCOUNTER — TRANSFERRED RECORDS (OUTPATIENT)
Dept: HEALTH INFORMATION MANAGEMENT | Facility: CLINIC | Age: 55
End: 2025-06-18
Payer: COMMERCIAL

## 2025-07-29 ENCOUNTER — TRANSFERRED RECORDS (OUTPATIENT)
Dept: HEALTH INFORMATION MANAGEMENT | Facility: CLINIC | Age: 55
End: 2025-07-29

## 2025-08-25 ENCOUNTER — TRANSFERRED RECORDS (OUTPATIENT)
Dept: HEALTH INFORMATION MANAGEMENT | Facility: CLINIC | Age: 55
End: 2025-08-25